# Patient Record
Sex: FEMALE | Race: WHITE | NOT HISPANIC OR LATINO | URBAN - METROPOLITAN AREA
[De-identification: names, ages, dates, MRNs, and addresses within clinical notes are randomized per-mention and may not be internally consistent; named-entity substitution may affect disease eponyms.]

---

## 2024-08-19 ENCOUNTER — OFFICE VISIT (OUTPATIENT)
Dept: SPORTS MEDICINE | Facility: CLINIC | Age: 54
End: 2024-08-19
Payer: COMMERCIAL

## 2024-08-19 ENCOUNTER — HOSPITAL ENCOUNTER (OUTPATIENT)
Dept: RADIOLOGY | Facility: HOSPITAL | Age: 54
Discharge: HOME | End: 2024-08-19
Attending: ORTHOPAEDIC SURGERY
Payer: COMMERCIAL

## 2024-08-19 VITALS — BODY MASS INDEX: 25.99 KG/M2 | WEIGHT: 156 LBS | HEIGHT: 65 IN

## 2024-08-19 DIAGNOSIS — M25.561 PAIN AND SWELLING OF RIGHT KNEE: ICD-10-CM

## 2024-08-19 DIAGNOSIS — M25.561 RIGHT KNEE PAIN, UNSPECIFIED CHRONICITY: Primary | ICD-10-CM

## 2024-08-19 DIAGNOSIS — M25.561 RIGHT KNEE PAIN, UNSPECIFIED CHRONICITY: ICD-10-CM

## 2024-08-19 DIAGNOSIS — M25.461 PAIN AND SWELLING OF RIGHT KNEE: ICD-10-CM

## 2024-08-19 DIAGNOSIS — M25.461 EFFUSION OF RIGHT KNEE: ICD-10-CM

## 2024-08-19 PROCEDURE — 73564 X-RAY EXAM KNEE 4 OR MORE: CPT | Mod: 26,RT,, | Performed by: RADIOLOGY

## 2024-08-19 PROCEDURE — 1159F MED LIST DOCD IN RCRD: CPT | Mod: CPTII,S$GLB,, | Performed by: ORTHOPAEDIC SURGERY

## 2024-08-19 PROCEDURE — 73564 X-RAY EXAM KNEE 4 OR MORE: CPT | Mod: TC,PN,RT

## 2024-08-19 PROCEDURE — 1160F RVW MEDS BY RX/DR IN RCRD: CPT | Mod: CPTII,S$GLB,, | Performed by: ORTHOPAEDIC SURGERY

## 2024-08-19 PROCEDURE — 99204 OFFICE O/P NEW MOD 45 MIN: CPT | Mod: S$GLB,,, | Performed by: ORTHOPAEDIC SURGERY

## 2024-08-19 PROCEDURE — 99999 PR PBB SHADOW E&M-NEW PATIENT-LVL III: CPT | Mod: PBBFAC,,, | Performed by: ORTHOPAEDIC SURGERY

## 2024-08-19 PROCEDURE — 73562 X-RAY EXAM OF KNEE 3: CPT | Mod: 26,59,LT, | Performed by: RADIOLOGY

## 2024-08-19 PROCEDURE — 73562 X-RAY EXAM OF KNEE 3: CPT | Mod: TC,PN,LT

## 2024-08-19 RX ORDER — NAPROXEN 500 MG/1
500 TABLET ORAL 2 TIMES DAILY
Qty: 60 TABLET | Refills: 0 | Status: SHIPPED | OUTPATIENT
Start: 2024-08-19

## 2024-08-19 RX ORDER — IBUPROFEN 200 MG
200 TABLET ORAL
COMMUNITY
End: 2024-08-19

## 2024-08-19 RX ORDER — DICLOFENAC SODIUM 10 MG/G
2 GEL TOPICAL 3 TIMES DAILY
Qty: 1 EACH | Refills: 1 | Status: SHIPPED | OUTPATIENT
Start: 2024-08-19

## 2024-08-19 NOTE — PROGRESS NOTES
Patient ID: Zainab Mcgraw  YOB: 1970  MRN: [id number]    Chief Complaint: Pain, Injury, and Swelling of the Right Knee    Referred By: Surgery patient     History of Present Illness: Zainab Mcgraw is a 53 y.o. female [facility] ([facility]) Data Unavailable with a chief complaint of Pain, Injury, and Swelling of the Right Knee      Zainab Mcgraw presents today 1 week s/p right knee arthroscopy, right knee ACL reconstruction with partial thickness quad tendon autograft, and partial meniscectomy on 8/13/24. She presents FWB/WBAT: right lower extremity with TROM brace/assistive devices. The patient has started physical therapy at [facility] working with Luke [place]. Overall there are no issues or concerns.     Past Medical History:   History reviewed. No pertinent past medical history.  History reviewed. No pertinent surgical history.  No family history on file.  Social History     Socioeconomic History    Marital status: Single   Tobacco Use    Smoking status: Never     Passive exposure: Past    Smokeless tobacco: Never   Social History Narrative    ** Merged History Encounter **          Social Determinants of Health     Financial Resource Strain: Low Risk  (8/15/2024)    Received from [facility]    Overall Financial Resource Strain (CARDIA)     Difficulty of Paying Living Expenses: Not hard at all   Food Insecurity: No Food Insecurity (8/15/2024)    Received from [facility]    Hunger Vital Sign     Worried About Running Out of Food in the Last Year: Never true     Ran Out of Food in the Last Year: Never true   Transportation Needs: No Transportation Needs (8/15/2024)    Received from [facility]    PRAPARE - Transportation     Lack of Transportation (Medical): No     Lack of Transportation (Non-Medical): No   Housing Stability: Unknown (8/15/2024)    Received from [facility]    Housing Stability Vital Sign     Unable to Pay for Housing in the  Last Year: No     Homeless in the Last Year: No     Medication List with Changes/Refills   New Medications    DICLOFENAC SODIUM (VOLTAREN) 1 % GEL    Apply 2 g topically 3 (three) times daily.    NAPROXEN (NAPROSYN) 500 MG TABLET    Take 1 tablet (500 mg total) by mouth 2 (two) times daily.   Current Medications    CHOLECALCIFEROL, VITD3,/VIT K2 (VITAMIN D3-VITAMIN K2 ORAL)    Take by mouth.    LACTOBACILLUS RHAMNOSUS GG (CULTURELLE) 10 BILLION CELL CAPSULE    Take 1 capsule by mouth.    TURMERIC ORAL    Take 1 capsule by mouth every evening.   Discontinued Medications    IBUPROFEN (ADVIL,MOTRIN) 200 MG TABLET    Take 200 mg by mouth.     Review of patient's allergies indicates:  No Known Allergies  ROS    Physical Exam:   Body mass index is 25.96 kg/m².  There were no vitals filed for this visit.   GENERAL: Well appearing, appropriate for stated age, no acute distress.  CARDIOVASCULAR: Pulses regular by peripheral palpation.  PULMONARY: Respirations are even and non-labored.  NEURO: Awake, alert, and oriented x 3.  PSYCH: Mood & affect are appropriate.  HEENT: Head is normocephalic and atraumatic.    Ortho/SPM Exam  Right Knee Exam  No signs of infection  Minimal effusion   Knee ROM full extension to 90 degrees flexion   All compartments are soft and compressible. Calf soft non-tender. Intact EHL, FHL, gastrocsoleus, and tibialis anterior. Sensation intact to light touch in superficial peroneal, deep peroneal, tibial, sural, and saphenous nerve distributions. Foot warm and well perfused with capillary refill of less than 2 seconds and palpable pedal pulses.       Imaging:   XR Results:  Results for orders placed during the hospital encounter of 08/19/24    X-ray Knee Ortho Right with Flexion    Narrative  EXAM: XR KNEE ORTHO RIGHT WITH FLEXION    CLINICAL HISTORY: Knee pain    FINDINGS:  No fracture or dislocation is identified.  A small joint effusion is visible on the lateral view.  Joint spaces are maintained at  both knees.    Impression  1.  No evidence of acute osseous injury.  2.  Small right knee joint effusion.    Finalized on: 8/19/2024 4:34 PM By:  Gabe Rodriguez  [facility]# [id number]      2024-08-19 16:36:[id number]    BRODY      Relevant imaging results reviewed and interpreted by me, discussed with the patient and / or family today.      Patient Instructions   Assessment:  Zainab Mcgraw is a 53 y.o. female [facility] ([facility]) Data Unavailable with a chief complaint of Pain, Injury, and Swelling of the Right Knee    1 week right knee arthroscopy, right knee ACL reconstruction with partial thickness quad tendon autograft, and partial meniscectomy on 8/13/24    Encounter Diagnoses   Name Primary?    Right knee pain, unspecified chronicity Yes    S/P ACL reconstruction     S/P arthroscopic partial medial meniscectomy     Post-operative state     Weakness of right quadriceps muscle     Chronic instability of right knee     Acute postoperative pain of right groin       Plan:  Recommended to continue physical therapy   Discussed stopping all prescription medication and only using OTC   Follow up in 1 week as schedule     Follow-up: 1 week or sooner if there are any problems between now and then.    Leave Review:   Google: Leave Google Review  Healthgrades: Leave Healthgrades Review    After Hours Number: [phone]      Provider Note/Medical Decision Making:   We discussed continuing physical therapy and working on quad strength at this time her quad muscle is weak and over compensating muscles are causing some pain.  Discussed with therapist as well.    I discussed worrisome and red flag signs and symptoms with the patient. The patient expressed understanding and agreed to alert me immediately or to go to the emergency room if they experience any of these.   Treatment plan was developed with input from the patient/family, and they expressed understanding and agreement with the plan. All questions  were answered today.      Tereza Rodríguez PA-C  Sports Medicine Physician Assistant       Disclaimer: This note was prepared using a voice recognition system and is likely to have sound alike errors within the text.

## 2024-08-20 NOTE — PATIENT INSTRUCTIONS
Assessment:  Zainab Mcgraw is a  53 y.o. female [facility] ([facility]) Data Unavailable with a chief complaint of Pain, Injury, and Swelling of the Right Knee  NP, self referred (has several family members that are patients), presents today for right knee pain  Right knee pain    Encounter Diagnoses   Name Primary?    Right knee pain, unspecified chronicity Yes    Pain and swelling of right knee     Body mass index (BMI) 25.0-25.9, adult       Plan:  Plan CSI on Friday due to colonoscopy scheduled for tomorrow   Start naproxen 500mg BID & Voltaren gel after colonoscopy  Continue compressive knee brace  Follow-up in 6 weeks  If no better did discuss possible MRI    Follow-up: 6 weeks or sooner if there are any problems between now and then.    Leave Review:   Google: Leave Google Review  Healthgrades: Leave Healthgrades Review    After Hours Number: [phone]

## 2024-08-20 NOTE — PROGRESS NOTES
Patient ID: Zainab Mcgraw  YOB: 1970  MRN: [id number]    Chief Complaint: Pain, Injury, and Swelling of the Right Knee    Referred By: Family members     History of Present Illness: Zainab Mcgraw is a  53 y.o. female [facility] ([facility]) Data Unavailable with a chief complaint of Pain, Injury, and Swelling of the Right Knee    Zainab Mcgraw is a 53 y.o. female presents to clinic today for evaluation of right knee pain.  She was self-referred for further evaluation management of the knee pain that has been going on for several months.  She originally notice injury back in February which she stepped off a ledge and hyperextending her knee.  She has placed her knee in a knee brace in had continued with doing at-home exercises.  She stated she notices swelling to go down and noticed that the knee pain got better.  She then re-injured her knee on 08/14 while playing pickleball without her brace.  She has noticed swelling and pain since the pain is worse with walking, moving or changing positions.  Rates the pain as a 3/10.  Denies any fevers, chills, night sweats, numbness and tingling    Pain  Associated symptoms include joint swelling. Pertinent negatives include no abdominal pain, chest pain, chills, coughing, fever, nausea, numbness, rash, sore throat or vomiting.   Injury  Associated symptoms include joint swelling. Pertinent negatives include no abdominal pain, chest pain, chills, coughing, fever, nausea, numbness, rash, sore throat or vomiting.   Swelling  Associated symptoms include joint swelling. Pertinent negatives include no abdominal pain, chest pain, chills, coughing, fever, nausea, numbness, rash, sore throat or vomiting.       Past Medical History:   History reviewed. No pertinent past medical history.  History reviewed. No pertinent surgical history.  No family history on file.  Social History     Socioeconomic History    Marital status: Single   Tobacco  Use    Smoking status: Never     Passive exposure: Past    Smokeless tobacco: Never   Social History Narrative    ** Merged History Encounter **          Social Determinants of Health     Financial Resource Strain: Low Risk  (8/15/2024)    Received from [facility]    Overall Financial Resource Strain (CARDIA)     Difficulty of Paying Living Expenses: Not hard at all   Food Insecurity: No Food Insecurity (8/15/2024)    Received from [facility]    Hunger Vital Sign     Worried About Running Out of Food in the Last Year: Never true     Ran Out of Food in the Last Year: Never true   Transportation Needs: No Transportation Needs (8/15/2024)    Received from [facility]    PRAPARE - Transportation     Lack of Transportation (Medical): No     Lack of Transportation (Non-Medical): No   Housing Stability: Unknown (8/15/2024)    Received from [facility]    Housing Stability Vital Sign     Unable to Pay for Housing in the Last Year: No     Homeless in the Last Year: No     Medication List with Changes/Refills   New Medications    DICLOFENAC SODIUM (VOLTAREN) 1 % GEL    Apply 2 g topically 3 (three) times daily.    NAPROXEN (NAPROSYN) 500 MG TABLET    Take 1 tablet (500 mg total) by mouth 2 (two) times daily.   Current Medications    CHOLECALCIFEROL, VITD3,/VIT K2 (VITAMIN D3-VITAMIN K2 ORAL)    Take by mouth.    LACTOBACILLUS RHAMNOSUS GG (CULTURELLE) 10 BILLION CELL CAPSULE    Take 1 capsule by mouth.    TURMERIC ORAL    Take 1 capsule by mouth every evening.   Discontinued Medications    IBUPROFEN (ADVIL,MOTRIN) 200 MG TABLET    Take 200 mg by mouth.     Review of patient's allergies indicates:  No Known Allergies  Review of Systems   Constitutional: Negative for chills and fever.   HENT:  Negative for sore throat.    Eyes:  Negative for pain.   Cardiovascular:  Negative for chest pain and leg swelling.   Respiratory:  Negative for cough and shortness of breath.    Skin:  Negative for itching and rash.    Musculoskeletal:  Positive for joint pain and joint swelling.   Gastrointestinal:  Negative for abdominal pain, nausea and vomiting.   Genitourinary:  Negative for dysuria.   Neurological:  Negative for dizziness, numbness and paresthesias.       Physical Exam:   Body mass index is 25.96 kg/m².  There were no vitals filed for this visit.   GENERAL: Well appearing, appropriate for stated age, no acute distress.  CARDIOVASCULAR: Pulses regular by peripheral palpation.  PULMONARY: Respirations are even and non-labored.  NEURO: Awake, alert, and oriented x 3.  PSYCH: Mood & affect are appropriate.  HEENT: Head is normocephalic and atraumatic.  General    Nursing note and vitals reviewed.          Right Knee Exam   Right knee exam is normal.    Inspection   Effusion: present    Tenderness   The patient is tender to palpation of the medial joint line.    Crepitus   The patient has crepitus of the patella.    Range of Motion   Extension:  0   Flexion:  130     Tests   Meniscus   Michelle:  Medial - positive   Ligament Examination   Lachman: normal (-1 to 2mm)   PCL-Posterior Drawer: normal (0 to 2mm)     MCL - Valgus: normal (0 to 2mm)  LCL - Varus: normal    Other   Sensation: normal    Left Knee Exam   Left knee exam is normal.    Inspection   Effusion: absent    Tenderness   The patient is experiencing no tenderness.     Crepitus   The patient has crepitus of the patella.    Range of Motion   Extension:  0   Flexion:  140     Tests   Stability   Lachman: normal (-1 to 2mm)   PCL-Posterior Drawer: normal (0 to 2mm)  MCL - Valgus: normal (0 to 2mm)  LCL - Varus: normal (0 to 2mm)    Other   Sensation: normal    Muscle Strength   Right Lower Extremity   Hip Abduction: 5/5   Quadriceps:  5/5   Hamstrin/5   Left Lower Extremity   Hip Abduction: 5/5   Quadriceps:  5/5   Hamstrin/5     Vascular Exam     Right Pulses  Dorsalis Pedis:      2+  Posterior Tibial:      2+        Left Pulses  Dorsalis Pedis:       2+  Posterior Tibial:      2+        All compartments are soft and compressible. Calf soft non-tender. Intact EHL, FHL, gastroc soleus, and tibialis anterior. Sensation intact to light touch in superficial peroneal, deep peroneal, tibial, sural, and saphenous nerve distributions. Foot warm and well perfused with capillary refill of less than 2 seconds and palpable pedal pulses.     Imaging:    X-ray Knee Ortho Right with Flexion  Narrative: EXAM: XR KNEE ORTHO RIGHT WITH FLEXION    CLINICAL HISTORY: Knee pain    FINDINGS:  No fracture or dislocation is identified.  A small joint effusion is visible on the lateral view.  Joint spaces are maintained at both knees.  Impression: 1.  No evidence of acute osseous injury.  2.  Small right knee joint effusion.    Finalized on: 8/19/2024 4:34 PM By:  Gabe Rodriguez  [facility]# [id number]      2024-08-19 16:36:[id number]    BRRG      Relevant imaging results reviewed and interpreted by me, discussed with the patient and / or family today.     Other Tests:         Patient Instructions   Assessment:  Zainab Mcgraw is a  53 y.o. female [facility] ([facility]) Data Unavailable with a chief complaint of Pain, Injury, and Swelling of the Right Knee  NP, self referred (has several family members that are patients), presents today for right knee pain  Right knee pain    Encounter Diagnoses   Name Primary?    Right knee pain, unspecified chronicity Yes    Pain and swelling of right knee     Body mass index (BMI) 25.0-25.9, adult       Plan:  Plan CSI on Friday due to colonoscopy scheduled for tomorrow   Start naproxen 500mg BID & Voltaren gel after colonoscopy  Continue compressive knee brace  Follow-up in 6 weeks  If no better did discuss possible MRI    Follow-up: 6 weeks or sooner if there are any problems between now and then.    Leave Review:   Google: Leave Google Review  Healthgrades: Leave Healthgrades Review    After Hours Number: [phone]         Provider  Note/Medical Decision Making:       I discussed worrisome and red flag signs and symptoms with the patient. The patient expressed understanding and agreed to alert me immediately or to go to the emergency room if they experience any of these.   Treatment plan was developed with input from the patient/family, and they expressed understanding and agreement with the plan. All questions were answered today.          Maximiliano Quispe MD  Orthopaedic Surgery & Sports Medicine       Disclaimer: This note was prepared using a voice recognition system and is likely to have sound alike errors within the text.

## 2024-08-23 ENCOUNTER — OFFICE VISIT (OUTPATIENT)
Dept: SPORTS MEDICINE | Facility: CLINIC | Age: 54
End: 2024-08-23
Payer: COMMERCIAL

## 2024-08-23 VITALS — HEIGHT: 65 IN | WEIGHT: 155 LBS | BODY MASS INDEX: 25.83 KG/M2

## 2024-08-23 DIAGNOSIS — M25.461 PAIN AND SWELLING OF RIGHT KNEE: Primary | ICD-10-CM

## 2024-08-23 DIAGNOSIS — M25.561 PAIN AND SWELLING OF RIGHT KNEE: Primary | ICD-10-CM

## 2024-08-23 DIAGNOSIS — M25.461 EFFUSION OF RIGHT KNEE: ICD-10-CM

## 2024-08-23 PROCEDURE — 20610 DRAIN/INJ JOINT/BURSA W/O US: CPT | Mod: RT,S$GLB,, | Performed by: PHYSICIAN ASSISTANT

## 2024-08-23 PROCEDURE — 99499 UNLISTED E&M SERVICE: CPT | Mod: S$GLB,,, | Performed by: PHYSICIAN ASSISTANT

## 2024-08-23 PROCEDURE — 99999 PR PBB SHADOW E&M-EST. PATIENT-LVL III: CPT | Mod: PBBFAC,,, | Performed by: PHYSICIAN ASSISTANT

## 2024-08-23 RX ADMIN — METHYLPREDNISOLONE ACETATE 80 MG: 80 INJECTION, SUSPENSION INTRA-ARTICULAR; INTRALESIONAL; INTRAMUSCULAR; SOFT TISSUE at 11:08

## 2024-08-26 ENCOUNTER — CLINICAL SUPPORT (OUTPATIENT)
Dept: REHABILITATION | Facility: HOSPITAL | Age: 54
End: 2024-08-26
Payer: COMMERCIAL

## 2024-08-26 DIAGNOSIS — M25.661 DECREASED RANGE OF MOTION OF RIGHT KNEE: ICD-10-CM

## 2024-08-26 DIAGNOSIS — R29.898 WEAKNESS OF RIGHT LOWER EXTREMITY: Primary | ICD-10-CM

## 2024-08-26 DIAGNOSIS — M25.561 RIGHT KNEE PAIN, UNSPECIFIED CHRONICITY: ICD-10-CM

## 2024-08-26 PROCEDURE — 97161 PT EVAL LOW COMPLEX 20 MIN: CPT

## 2024-08-26 PROCEDURE — 97112 NEUROMUSCULAR REEDUCATION: CPT

## 2024-08-26 RX ORDER — METHYLPREDNISOLONE ACETATE 80 MG/ML
80 INJECTION, SUSPENSION INTRA-ARTICULAR; INTRALESIONAL; INTRAMUSCULAR; SOFT TISSUE
Status: DISCONTINUED | OUTPATIENT
Start: 2024-08-23 | End: 2024-08-26 | Stop reason: HOSPADM
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
## 2024-08-26 NOTE — PROCEDURES
Large Joint Aspiration/Injection: R knee    Date/Time: 8/23/2024 11:30 AM    Performed by: Tereza Hernandez PA-C  Authorized by: Tereza Hernandez PA-C    Consent Done?:  Yes (Verbal)  Indications:  Joint swelling and pain  Site marked: the procedure site was marked    Timeout: prior to procedure the correct patient, procedure, and site was verified    Prep: patient was prepped and draped in usual sterile fashion      Local anesthesia used?: Yes    Anesthesia:  Local infiltration  Local anesthetic:  Bupivacaine 0.5% without epinephrine, lidocaine 1% without epinephrine, topical anesthetic and lidocaine spray    Details:  Needle Size:  22 G  Approach:  Superior  Location:  Knee  Site:  R knee  Medications:  80 mg methylPREDNISolone acetate 80 mg/mL  Patient tolerance:  Patient tolerated the procedure well with no immediate complications     2cc 1% lidocaine plain, 2cc 0.5% marcaine plain, 0.5cc 80mg methylprednisolone    Procedure Note:  We discussed the risk and benefits of injections, including pain, infection, bleeding, damage to adjacent structures, risk of reaction to injection. We discussed the steroid/cortisone injections will not heal the problem but mat help decrease inflammation and help with symptoms. We discussed the risk of repeated injections. The patient expressed understanding and wanted to proceed with the injection. We performed a timeout to verify the proper patient, proper procedure, and the proper site. The injection site was prepared in a sterile fashion. The patient tolerated it well and there were no complication. We did discuss with the patient that steroid injections can cause some increase in blood sugar and blood pressure for up to a week after the injection.

## 2024-08-27 PROBLEM — M25.661 DECREASED RANGE OF MOTION OF RIGHT KNEE: Status: ACTIVE | Noted: 2024-08-27

## 2024-08-27 PROBLEM — R29.898 WEAKNESS OF RIGHT LOWER EXTREMITY: Status: ACTIVE | Noted: 2024-08-27

## 2024-08-27 PROBLEM — M25.561 RIGHT KNEE PAIN: Status: ACTIVE | Noted: 2024-08-27

## 2024-08-29 ENCOUNTER — CLINICAL SUPPORT (OUTPATIENT)
Dept: REHABILITATION | Facility: HOSPITAL | Age: 54
End: 2024-08-29
Payer: COMMERCIAL

## 2024-08-29 DIAGNOSIS — R29.898 WEAKNESS OF RIGHT LOWER EXTREMITY: ICD-10-CM

## 2024-08-29 DIAGNOSIS — M25.561 CHRONIC PAIN OF RIGHT KNEE: Primary | ICD-10-CM

## 2024-08-29 DIAGNOSIS — M25.661 DECREASED RANGE OF MOTION OF RIGHT KNEE: ICD-10-CM

## 2024-08-29 DIAGNOSIS — G89.29 CHRONIC PAIN OF RIGHT KNEE: Primary | ICD-10-CM

## 2024-08-29 PROCEDURE — 97530 THERAPEUTIC ACTIVITIES: CPT

## 2024-08-29 PROCEDURE — 97110 THERAPEUTIC EXERCISES: CPT

## 2024-08-29 PROCEDURE — 97112 NEUROMUSCULAR REEDUCATION: CPT

## 2024-08-31 NOTE — PROGRESS NOTES
"[facility]   Physical Therapy Treatment Note        Name: Zainab Mcgraw  Clinic Number: [id number]    Therapy Diagnosis:   Encounter Diagnoses   Name Primary?    Chronic pain of right knee Yes    Weakness of right lower extremity     Decreased range of motion of right knee      Physician: Tereza Hernandez, *    Visit Date: 8/29/2024    Physician Orders: PT Eval and Treat  Medical Diagnosis from Referral: Right knee pain, unspecified chronicity [M25.561]   Evaluation Date: 8/26/2024  Authorization Period Expiration: 8/19/2025  Plan of Care Expiration: 11/19/2024  Progress Note Due: 9/24/2024  Visit # / Visits authorized: 1/15   FOTO: 1/3 (last performed on 8/26/2024)     Precautions: Standard    PTA Visit #: 0/5     Time In: 1600  Time Out: 1700  Total Billable Time: 55 minutes    SUBJECTIVE     Pt reports:  She feels pretty good  Compliance with Hep: Daily  Response to previous treatment: no adverse reactions to treatment/updated HEP  Functional change: Better    Pain: 0/10   Worst: 8/10  Location: knee      OBJECTIVE     Objective Measures updated at progress report unless specified otherwise.      Treatment     Gym/Equipment Access: full  Time to Complete Exercises: increased for cueing and education    Lisa received the treatments listed below:      CPT Intervention Performed   Today Duration / Intensity   MT Patella MOBS x   5 minutes - long sit   TE Chondral Mobility:  x Upright Bike Level 4 -      Prone Knee Flexion X 30" holds, x5 each side.     Dynamic Warm X Hugs, sweeps, grabs     Seated Knee Ext matrix x DL  4x8  #25     Seated Knee flexion Matrix x DL  4x8  #25     RDL with KB SS2- 3 rounds x #25 x15     Hip Thrust SS2- 3 rounds x #10 sand bag x15   NMR Hip Miniband Series: [place] Loop  Marches  Extension  Lateral Walks         2 mins  2 mins  3 laps      Side Planks    30s              TA Heel elevated Squats         Sled Push SS1 x #70 - push & pull 1 lap x3 rounds    " " Wall Sits  SS1 x 30" x3 rounds   PLAN             CPT Codes available for Billing:   (5) minutes of Manual therapy (MT) to improve pain and ROM.  (30) minutes of Therapeutic Exercise (TE) to develop strength, endurance, range of motion, and flexibility.  (15) minutes of Neuromuscular Re-Education (NMR)  to improve: Balance, Coordination, Kinesthetic, Sense, Proprioception, and Posture.  (15) minutes of Therapeutic Activities (TA) to improve functional performance.  (0) minutes of Gait Training (GT) to improve functional gait mechanics.  (0) minutes of Self- Care and Education  Unattended Electrical Stimulation (ES) for muscle performance or pain modulation.  Vasopneumatic Device Therapy () for management of swelling/edema. (22589)  BFR: Blood flow restriction applied during exercise  NP or (-): Not Performed    See EMR under MEDIA for written consent provided for Dry Needling- DATE   Palpation Assessment to determine the necessity for Functional Dry Needling     PATIENT EDUCATION AND HOME EXERCISES      Education/Self-Care provided:  (included in treatment unless specified above) minutes   Patient educated on the impairments noted above and the effects of physical therapy intervention to improve overall condition and Quality of Life  Patient was educated on all the above exercise prior/during/after for proper posture, positioning, and execution for safe performance with home exercise program.      Written Home Exercises Provided: yes. Prefers: [x] Printed [x] Electronic  Exercises were reviewed and Rylee was able to demonstrate them prior to the end of the session.  Rylee demonstrated good understanding of the education provided. See EMR under Patient Instructions for exercises provided during therapy sessions.    ASSESSMENT     Lisa Mcgraw tolerated Physical Therapy  session well with minimal  complaints of pain or discomfort.  Objective findings are improving with pain, range of motion, strength.  Lisabharti Mcclureelan " continues to have difficulty with heavy extensor chain resistance, but was able to complete with modifications.  Therapy exercises were reviewed by revisiting exercises given from previous home exercise program while adding circuit style exercises to focus on global exercise based strength work.  Handouts were not issued during today's visit. Zainab demonstrated good understanding of new exercises and will continue to progress at home until next follow-up.      Lisa Is progressing well towards her goals.   Pt prognosis is Good.     Pt will continue to benefit from skilled outpatient physical therapy to address the deficits listed in the problem list box on initial evaluation, provide pt/family education and to maximize pt's level of independence in the home and community environment.     Pt's spiritual, cultural and educational needs considered and pt agreeable to plan of care and goals.    Anticipated Barriers for therapy: adherence to treatment plan and coping style       Short Term Goals:  6 weeks Status  Date Met   PAIN: Pt will report worst pain of 6/10 in order to progress toward max functional ability and improve quality of life. [x] Progressing  [] Met  [] Not Met     FUNCTION: Patient will demonstrate improved function as indicated by a score of greater than or equal to 80 out of 100 on FOTO. [x] Progressing  [] Met  [] Not Met     MOBILITY: Patient will improve AROM to 50% of stated goals, listed in objective measures above, in order to progress towards independence with functional activities.  [x] Progressing  [] Met  [] Not Met     STRENGTH: Patient will improve strength to 50% of stated goals, listed in objective measures above, in order to progress towards independence with functional activities. [x] Progressing  [] Met  [] Not Met     POSTURE: Patient will correct postural deviations in sitting and standing, to decrease pain and promote long term stability.  [x] Progressing  [] Met  [] Not Met      GAIT: Patient will demonstrate improved gait mechanics including improved step length and stance time in order to improve functional mobility, improve quality of life, and decrease risk of further injury or fall.  [x] Progressing  [] Met  [] Not Met     HEP: Patient will demonstrate independence with HEP in order to progress toward functional independence. [x] Progressing  [] Met  [] Not Met        Long Term Goals:  12 weeks Status Date Met   PAIN: Pt will report worst pain of 2/10 in order to progress toward max functional ability and improve quality of life [x] Progressing  [] Met  [] Not Met     FUNCTION: Patient will demonstrate improved function as indicated by a score of greater than or equal to 83 out of 100 on FOTO. [x] Progressing  [] Met  [] Not Met     MOBILITY: Patient will improve AROM to stated goals, listed in objective measures above, in order to return to maximal functional potential and improve quality of life.  [x] Progressing  [] Met  [] Not Met     STRENGTH: Patient will improve strength to stated goals, listed in objective measures above, in order to improve functional independence and quality of life.  [x] Progressing  [] Met  [] Not Met     GAIT: Patient will demonstrate normalized gait mechanics with minimal compensation in order to return to PLOF. [x] Progressing  [] Met  [] Not Met     Patient will return to normal ADL's, IADL's, community involvement, recreational activities, and work-related activities with less than or equal to 2/10 pain and maximal function.  [x] Progressing  [] Met  [] Not Met        Plan      Plan of care Certification: 8/26/2024 to 11/19/2024.    PLAN     Continue Plan of Care (POC) and progress per patient tolerance. See Treatment section for exercise progression.    Columba Daniel, PT    Disclaimer: This note was prepared using a voice recognition system and is likely to have sound alike errors within the text.

## 2024-09-05 ENCOUNTER — CLINICAL SUPPORT (OUTPATIENT)
Dept: REHABILITATION | Facility: HOSPITAL | Age: 54
End: 2024-09-05
Payer: COMMERCIAL

## 2024-09-05 DIAGNOSIS — M25.661 DECREASED RANGE OF MOTION OF RIGHT KNEE: ICD-10-CM

## 2024-09-05 DIAGNOSIS — R29.898 WEAKNESS OF RIGHT LOWER EXTREMITY: ICD-10-CM

## 2024-09-05 DIAGNOSIS — G89.29 CHRONIC PAIN OF RIGHT KNEE: Primary | ICD-10-CM

## 2024-09-05 DIAGNOSIS — M25.561 CHRONIC PAIN OF RIGHT KNEE: Primary | ICD-10-CM

## 2024-09-05 PROCEDURE — 97530 THERAPEUTIC ACTIVITIES: CPT

## 2024-09-05 PROCEDURE — 97110 THERAPEUTIC EXERCISES: CPT

## 2024-09-06 ENCOUNTER — CLINICAL SUPPORT (OUTPATIENT)
Dept: REHABILITATION | Facility: HOSPITAL | Age: 54
End: 2024-09-06
Payer: COMMERCIAL

## 2024-09-06 DIAGNOSIS — G89.29 CHRONIC PAIN OF RIGHT KNEE: Primary | ICD-10-CM

## 2024-09-06 DIAGNOSIS — M25.661 DECREASED RANGE OF MOTION OF RIGHT KNEE: ICD-10-CM

## 2024-09-06 DIAGNOSIS — M25.561 CHRONIC PAIN OF RIGHT KNEE: Primary | ICD-10-CM

## 2024-09-06 DIAGNOSIS — R29.898 WEAKNESS OF RIGHT LOWER EXTREMITY: ICD-10-CM

## 2024-09-06 PROCEDURE — 97110 THERAPEUTIC EXERCISES: CPT

## 2024-09-06 PROCEDURE — 97112 NEUROMUSCULAR REEDUCATION: CPT

## 2024-09-06 PROCEDURE — 97530 THERAPEUTIC ACTIVITIES: CPT

## 2024-09-06 NOTE — PROGRESS NOTES
"[facility]   Physical Therapy Treatment Note        Name: Zainab Mcgraw  Clinic Number: [id number]    Therapy Diagnosis:   Encounter Diagnoses   Name Primary?    Chronic pain of right knee Yes    Weakness of right lower extremity     Decreased range of motion of right knee      Physician: Tereza Hernandez, *    Visit Date: 9/5/2024    Physician Orders: PT Eval and Treat  Medical Diagnosis from Referral: Right knee pain, unspecified chronicity [M25.561]   Evaluation Date: 8/26/2024  Authorization Period Expiration: 8/19/2025  Plan of Care Expiration: 11/19/2024  Progress Note Due: 9/24/2024  Visit # / Visits authorized: 2 /15   FOTO: 1/3 (last performed on 8/26/2024)     Precautions: Standard    PTA Visit #: 0/5     Time In: 1600  Time Out: 1700  Total Billable Time: 55 minutes    SUBJECTIVE     Pt reports:  Lisa reports a great weekend in [facility], with no increased pain or discomfort. She was able to walk over 15,000 steps a day while she was in [facility] and did not have too many issues.  Compliance with Hep: Daily  Response to previous treatment: no adverse reactions to treatment/updated HEP  Functional change: Better    Pain: 0/10   Worst: 8/10  Location: knee      OBJECTIVE     Objective Measures updated at progress report unless specified otherwise.      Treatment     Gym/Equipment Access: full  Time to Complete Exercises: increased for cueing and education    Lisa received the treatments listed below:      CPT Intervention Performed   Today Duration / Intensity   MT Patella MOBS x   5 minutes - long sit   TE Chondral Mobility:  x Upright Bike Level 4 -      Prone Knee Flexion X 30" holds, x5 each side.     Dynamic Warm X Hugs, sweeps, grabs     Seated Knee Ext matrix x DL  4x8  #25     Seated Knee flexion Matrix x DL  4x8  #25     RDL with KB SS2- 3 rounds x #25 x15     Hip Thrust SS2- 3 rounds x #10 sand bag x15   NMR Hip Miniband Series: [place] Loop  Marches  Extension  Lateral " "Walks         2 mins  2 mins  3 laps      Side Planks    30s              TA Heel elevated Squats         Sled Push SS1 x #70 - push & pull 1 lap x3 rounds     Wall Sits  SS1 x 30" x3 rounds   PLAN             CPT Codes available for Billing:   (5) minutes of Manual therapy (MT) to improve pain and ROM.  (40) minutes of Therapeutic Exercise (TE) to develop strength, endurance, range of motion, and flexibility.  (00) minutes of Neuromuscular Re-Education (NMR)  to improve: Balance, Coordination, Kinesthetic, Sense, Proprioception, and Posture.  (15) minutes of Therapeutic Activities (TA) to improve functional performance.  (0) minutes of Gait Training (GT) to improve functional gait mechanics.  (0) minutes of Self- Care and Education  Unattended Electrical Stimulation (ES) for muscle performance or pain modulation.  Vasopneumatic Device Therapy () for management of swelling/edema. (99092)  BFR: Blood flow restriction applied during exercise  NP or (-): Not Performed    See EMR under MEDIA for written consent provided for Dry Needling- DATE   Palpation Assessment to determine the necessity for Functional Dry Needling     PATIENT EDUCATION AND HOME EXERCISES      Education/Self-Care provided:  (included in treatment unless specified above) minutes   Patient educated on the impairments noted above and the effects of physical therapy intervention to improve overall condition and Quality of Life  Patient was educated on all the above exercise prior/during/after for proper posture, positioning, and execution for safe performance with home exercise program.      Written Home Exercises Provided: yes. Prefers: [x] Printed [x] Electronic  Exercises were reviewed and Rylee was able to demonstrate them prior to the end of the session.  Rylee demonstrated good understanding of the education provided. See EMR under Patient Instructions for exercises provided during therapy sessions.    ASSESSMENT     Ms Gonzalez tolerated PT " session with minimal complaints pain or discomfort. She was challenged with functional strength training with sled due to cardiovascular endurance and muscular endurance deficits, but no pain or adverse reactions were reported to date.    Lisa Is progressing well towards her goals.   Pt prognosis is Good.     Pt will continue to benefit from skilled outpatient physical therapy to address the deficits listed in the problem list box on initial evaluation, provide pt/family education and to maximize pt's level of independence in the home and community environment.     Pt's spiritual, cultural and educational needs considered and pt agreeable to plan of care and goals.    Anticipated Barriers for therapy: adherence to treatment plan and coping style       Short Term Goals:  6 weeks Status  Date Met   PAIN: Pt will report worst pain of 6/10 in order to progress toward max functional ability and improve quality of life. [x] Progressing  [] Met  [] Not Met     FUNCTION: Patient will demonstrate improved function as indicated by a score of greater than or equal to 80 out of 100 on FOTO. [x] Progressing  [] Met  [] Not Met     MOBILITY: Patient will improve AROM to 50% of stated goals, listed in objective measures above, in order to progress towards independence with functional activities.  [x] Progressing  [] Met  [] Not Met     STRENGTH: Patient will improve strength to 50% of stated goals, listed in objective measures above, in order to progress towards independence with functional activities. [x] Progressing  [] Met  [] Not Met     POSTURE: Patient will correct postural deviations in sitting and standing, to decrease pain and promote long term stability.  [x] Progressing  [] Met  [] Not Met     GAIT: Patient will demonstrate improved gait mechanics including improved step length and stance time in order to improve functional mobility, improve quality of life, and decrease risk of further injury or fall.  [x]  Progressing  [] Met  [] Not Met     HEP: Patient will demonstrate independence with HEP in order to progress toward functional independence. [x] Progressing  [] Met  [] Not Met        Long Term Goals:  12 weeks Status Date Met   PAIN: Pt will report worst pain of 2/10 in order to progress toward max functional ability and improve quality of life [x] Progressing  [] Met  [] Not Met     FUNCTION: Patient will demonstrate improved function as indicated by a score of greater than or equal to 83 out of 100 on FOTO. [x] Progressing  [] Met  [] Not Met     MOBILITY: Patient will improve AROM to stated goals, listed in objective measures above, in order to return to maximal functional potential and improve quality of life.  [x] Progressing  [] Met  [] Not Met     STRENGTH: Patient will improve strength to stated goals, listed in objective measures above, in order to improve functional independence and quality of life.  [x] Progressing  [] Met  [] Not Met     GAIT: Patient will demonstrate normalized gait mechanics with minimal compensation in order to return to PLOF. [x] Progressing  [] Met  [] Not Met     Patient will return to normal ADL's, IADL's, community involvement, recreational activities, and work-related activities with less than or equal to 2/10 pain and maximal function.  [x] Progressing  [] Met  [] Not Met          PLAN     Continue Plan of Care (POC) and progress per patient tolerance. See Treatment section for exercise progression.    Columba Daniel, PT    Disclaimer: This note was prepared using a voice recognition system and is likely to have sound alike errors within the text.

## 2024-09-06 NOTE — PROGRESS NOTES
"[facility]   Physical Therapy Treatment Note        Name: Zainab Mcgraw  Clinic Number: [id number]    Therapy Diagnosis:   Encounter Diagnoses   Name Primary?    Chronic pain of right knee Yes    Weakness of right lower extremity     Decreased range of motion of right knee      Physician: Tereza Hernandez, *    Visit Date: 9/6/2024    Physician Orders: PT Eval and Treat  Medical Diagnosis from Referral: Right knee pain, unspecified chronicity [M25.561]   Evaluation Date: 8/26/2024  Authorization Period Expiration: 8/19/2025  Plan of Care Expiration: 11/19/2024  Progress Note Due: 9/24/2024  Visit # / Visits authorized: 1/15   FOTO: 1/3 (last performed on 8/26/2024)     Precautions: Standard    PTA Visit #: 0/5     Time In: 9:00 AM  Time Out: 10:00 AM  Total Billable Time: 55 minutes    SUBJECTIVE     Pt reports:  She feels pretty good but she is very sore from her last visit yesterday.  Compliance with Hep: Daily  Response to previous treatment: no adverse reactions to treatment/updated HEP  Functional change: Better    Pain: 0/10   Worst: 8/10  Location: knee      OBJECTIVE     Objective Measures updated at progress report unless specified otherwise.      Treatment     Gym/Equipment Access: full  Time to Complete Exercises: increased for cueing and education    Lisa received the treatments listed below:      CPT Intervention Performed   Today Duration / Intensity   MT Patella MOBS x   5 minutes - long sit   TE Chondral Mobility:  x Upright Bike Level 4 -      Prone Knee Flexion X 30" holds, x5 each side.     Dynamic Warm X Hugs, sweeps, grabs     Seated Knee Ext matrix - DL  4x8  #25     Seated Knee flexion Matrix- SS4  x DL  4x8  #25     RDL with KB SS2- 3 rounds - #25 x15     Hip Thrust SS2- 3 rounds SS3 x #10 sand bag x15   NMR Lateral Walks SS4 - 3 rounds     1 lap 3 rounds      Side Planks SS3 - 3 rounds   x 30s  each side 3 rounds              TA Heel elevated Squats     " "   Step Downs  x 3x10 each leg 6 inch box     Wall Ball Squats with pause x 3-5s hold 30x Green ball      Sled Push SS1 - #70 - push & pull 1 lap x3 rounds     Wall Sits  SS1 - 30" x3 rounds   PLAN             CPT Codes available for Billing:   (5) minutes of Manual therapy (MT) to improve pain and ROM.  (30) minutes of Therapeutic Exercise (TE) to develop strength, endurance, range of motion, and flexibility.  (15) minutes of Neuromuscular Re-Education (NMR)  to improve: Balance, Coordination, Kinesthetic, Sense, Proprioception, and Posture.  (15) minutes of Therapeutic Activities (TA) to improve functional performance.  (0) minutes of Gait Training (GT) to improve functional gait mechanics.  (0) minutes of Self- Care and Education  Unattended Electrical Stimulation (ES) for muscle performance or pain modulation.  Vasopneumatic Device Therapy () for management of swelling/edema. (50933)  BFR: Blood flow restriction applied during exercise  NP or (-): Not Performed    See EMR under MEDIA for written consent provided for Dry Needling- DATE   Palpation Assessment to determine the necessity for Functional Dry Needling     PATIENT EDUCATION AND HOME EXERCISES      Education/Self-Care provided:  (included in treatment unless specified above) minutes   Patient educated on the impairments noted above and the effects of physical therapy intervention to improve overall condition and Quality of Life  Patient was educated on all the above exercise prior/during/after for proper posture, positioning, and execution for safe performance with home exercise program.      Written Home Exercises Provided: yes. Prefers: [x] Printed [x] Electronic  Exercises were reviewed and Rylee was able to demonstrate them prior to the end of the session.  Rylee demonstrated good understanding of the education provided. See EMR under Patient Instructions for exercises provided during therapy sessions.    ASSESSMENT     Lisa Mcgraw tolerated " Physical Therapy  session well with minimal  complaints of pain or discomfort.  Objective findings are improving with pain, range of motion, strength.  Lisa Mcgraw continues to have difficulty with heavy extensor chain resistance and loaded knee flexion in closed chain activities   Therapy exercises were reviewed by revisiting exercises given from previous home exercise program while adding circuit style exercises to focus on global exercise based strength work.  Handouts were not issued during today's visit. Zainab demonstrated good understanding of new exercises and will continue to progress at home until next follow-up.      Lisa Is progressing well towards her goals.   Pt prognosis is Good.     Pt will continue to benefit from skilled outpatient physical therapy to address the deficits listed in the problem list box on initial evaluation, provide pt/family education and to maximize pt's level of independence in the home and community environment.     Pt's spiritual, cultural and educational needs considered and pt agreeable to plan of care and goals.    Anticipated Barriers for therapy: adherence to treatment plan and coping style       Short Term Goals:  6 weeks Status  Date Met   PAIN: Pt will report worst pain of 6/10 in order to progress toward max functional ability and improve quality of life. [x] Progressing  [] Met  [] Not Met     FUNCTION: Patient will demonstrate improved function as indicated by a score of greater than or equal to 80 out of 100 on FOTO. [x] Progressing  [] Met  [] Not Met     MOBILITY: Patient will improve AROM to 50% of stated goals, listed in objective measures above, in order to progress towards independence with functional activities.  [x] Progressing  [] Met  [] Not Met     STRENGTH: Patient will improve strength to 50% of stated goals, listed in objective measures above, in order to progress towards independence with functional activities. [x] Progressing  [] Met  [] Not  Met     POSTURE: Patient will correct postural deviations in sitting and standing, to decrease pain and promote long term stability.  [x] Progressing  [] Met  [] Not Met     GAIT: Patient will demonstrate improved gait mechanics including improved step length and stance time in order to improve functional mobility, improve quality of life, and decrease risk of further injury or fall.  [x] Progressing  [] Met  [] Not Met     HEP: Patient will demonstrate independence with HEP in order to progress toward functional independence. [x] Progressing  [] Met  [] Not Met        Long Term Goals:  12 weeks Status Date Met   PAIN: Pt will report worst pain of 2/10 in order to progress toward max functional ability and improve quality of life [x] Progressing  [] Met  [] Not Met     FUNCTION: Patient will demonstrate improved function as indicated by a score of greater than or equal to 83 out of 100 on FOTO. [x] Progressing  [] Met  [] Not Met     MOBILITY: Patient will improve AROM to stated goals, listed in objective measures above, in order to return to maximal functional potential and improve quality of life.  [x] Progressing  [] Met  [] Not Met     STRENGTH: Patient will improve strength to stated goals, listed in objective measures above, in order to improve functional independence and quality of life.  [x] Progressing  [] Met  [] Not Met     GAIT: Patient will demonstrate normalized gait mechanics with minimal compensation in order to return to PLOF. [x] Progressing  [] Met  [] Not Met     Patient will return to normal ADL's, IADL's, community involvement, recreational activities, and work-related activities with less than or equal to 2/10 pain and maximal function.  [x] Progressing  [] Met  [] Not Met        Plan      Plan of care Certification: 8/26/2024 to 11/19/2024.    PLAN     Continue Plan of Care (POC) and progress per patient tolerance. See Treatment section for exercise progression.    Ruben Saleh  PT    Disclaimer: This note was prepared using a voice recognition system and is likely to have sound alike errors within the text.

## 2024-09-10 ENCOUNTER — CLINICAL SUPPORT (OUTPATIENT)
Dept: REHABILITATION | Facility: HOSPITAL | Age: 54
End: 2024-09-10
Payer: COMMERCIAL

## 2024-09-10 DIAGNOSIS — M25.661 DECREASED RANGE OF MOTION OF RIGHT KNEE: ICD-10-CM

## 2024-09-10 DIAGNOSIS — R29.898 WEAKNESS OF RIGHT LOWER EXTREMITY: ICD-10-CM

## 2024-09-10 DIAGNOSIS — G89.29 CHRONIC PAIN OF RIGHT KNEE: Primary | ICD-10-CM

## 2024-09-10 DIAGNOSIS — M25.561 CHRONIC PAIN OF RIGHT KNEE: Primary | ICD-10-CM

## 2024-09-10 PROCEDURE — 97112 NEUROMUSCULAR REEDUCATION: CPT

## 2024-09-10 PROCEDURE — 97530 THERAPEUTIC ACTIVITIES: CPT

## 2024-09-10 PROCEDURE — 97110 THERAPEUTIC EXERCISES: CPT

## 2024-09-10 NOTE — PROGRESS NOTES
"[facility]   Physical Therapy Treatment Note        Name: Zainab Mcgraw  Clinic Number: [id number]    Therapy Diagnosis:   Encounter Diagnoses   Name Primary?    Chronic pain of right knee Yes    Weakness of right lower extremity     Decreased range of motion of right knee      Physician: Tereza Hernandez, *    Visit Date: 9/10/2024    Physician Orders: PT Eval and Treat  Medical Diagnosis from Referral: Right knee pain, unspecified chronicity [M25.561]   Evaluation Date: 8/26/2024  Authorization Period Expiration: 8/19/2025  Plan of Care Expiration: 11/19/2024  Progress Note Due: 9/24/2024  Visit # / Visits authorized: 1/15   FOTO: 1/3 (last performed on 8/26/2024)     Precautions: Standard    PTA Visit #: 0/5     Time In: 1:00 PM  Time Out: 2:00 PM  Total Billable Time: 55 minutes    SUBJECTIVE     Pt reports:  She has been feeling okay she has been busy prepping for the upcoming weather.   Compliance with Hep: Daily  Response to previous treatment: no adverse reactions to treatment/updated HEP  Functional change: Better    Pain: 0/10   Worst: 8/10  Location: knee      OBJECTIVE     Objective Measures updated at progress report unless specified otherwise.      Treatment     Gym/Equipment Access: full  Time to Complete Exercises: increased for cueing and education    Lisa received the treatments listed below:      CPT Intervention Performed   Today Duration / Intensity   MT Patella MOBS x   5 minutes - long sit   TE Chondral Mobility:  x Upright Bike Level 4 -      Prone Knee Flexion X 30" holds, x5 each side.     Dynamic Warm X Hugs, sweeps, grabs     Seated Knee Ext matrix x DL  4x8  #25     Seated Knee flexion Matrix- SS4  x DL  4x8  #25     RDL with KB SS2- 3 rounds x #25 x15     Hip Thrust SS2- 3 rounds SS3 x #10 sand bag x15   NMR Lateral Walks SS4 - 3 rounds   -  1 lap 3 rounds      Side Planks SS3 - 3 rounds   - 30s  each side 3 rounds              TA Heel elevated " "Squats        Step Downs  x 3x10 each leg 6 inch box     Wall Ball Squats with pause x 3-5s hold 30x Green ball      Sled Push SS1 x #70 - push & pull 1 lap x3 rounds     Wall Sits  SS1 x 30" x3 rounds   PLAN             CPT Codes available for Billing:   (5) minutes of Manual therapy (MT) to improve pain and ROM.  (30) minutes of Therapeutic Exercise (TE) to develop strength, endurance, range of motion, and flexibility.  (15) minutes of Neuromuscular Re-Education (NMR)  to improve: Balance, Coordination, Kinesthetic, Sense, Proprioception, and Posture.  (15) minutes of Therapeutic Activities (TA) to improve functional performance.  (0) minutes of Gait Training (GT) to improve functional gait mechanics.  (0) minutes of Self- Care and Education  Unattended Electrical Stimulation (ES) for muscle performance or pain modulation.  Vasopneumatic Device Therapy () for management of swelling/edema. (99505)  BFR: Blood flow restriction applied during exercise  NP or (-): Not Performed    See EMR under MEDIA for written consent provided for Dry Needling- DATE   Palpation Assessment to determine the necessity for Functional Dry Needling     PATIENT EDUCATION AND HOME EXERCISES      Education/Self-Care provided:  (included in treatment unless specified above) minutes   Patient educated on the impairments noted above and the effects of physical therapy intervention to improve overall condition and Quality of Life  Patient was educated on all the above exercise prior/during/after for proper posture, positioning, and execution for safe performance with home exercise program.      Written Home Exercises Provided: yes. Prefers: [x] Printed [x] Electronic  Exercises were reviewed and Rylee was able to demonstrate them prior to the end of the session.  Rylee demonstrated good understanding of the education provided. See EMR under Patient Instructions for exercises provided during therapy sessions.    ASSESSMENT     Lisa Mcgraw " tolerated Physical Therapy  session well with minimal  complaints of pain or discomfort.  Objective findings are improving with pain, range of motion, strength.  Lisa Mcgraw continues to have difficulty with heavy extensor chain resistance and loaded knee flexion in closed chain activities. Continued circuit training focused on LE loading in both open and closed chain to progress her tolerance to loading her knee in various patterns. .  Handouts were not issued during today's visit. Zainab demonstrated good understanding of new exercises and will continue to progress at home until next follow-up.      Lisa Is progressing well towards her goals.   Pt prognosis is Good.     Pt will continue to benefit from skilled outpatient physical therapy to address the deficits listed in the problem list box on initial evaluation, provide pt/family education and to maximize pt's level of independence in the home and community environment.     Pt's spiritual, cultural and educational needs considered and pt agreeable to plan of care and goals.    Anticipated Barriers for therapy: adherence to treatment plan and coping style       Short Term Goals:  6 weeks Status  Date Met   PAIN: Pt will report worst pain of 6/10 in order to progress toward max functional ability and improve quality of life. [x] Progressing  [] Met  [] Not Met     FUNCTION: Patient will demonstrate improved function as indicated by a score of greater than or equal to 80 out of 100 on FOTO. [x] Progressing  [] Met  [] Not Met     MOBILITY: Patient will improve AROM to 50% of stated goals, listed in objective measures above, in order to progress towards independence with functional activities.  [x] Progressing  [] Met  [] Not Met     STRENGTH: Patient will improve strength to 50% of stated goals, listed in objective measures above, in order to progress towards independence with functional activities. [x] Progressing  [] Met  [] Not Met     POSTURE: Patient will  correct postural deviations in sitting and standing, to decrease pain and promote long term stability.  [x] Progressing  [] Met  [] Not Met     GAIT: Patient will demonstrate improved gait mechanics including improved step length and stance time in order to improve functional mobility, improve quality of life, and decrease risk of further injury or fall.  [x] Progressing  [] Met  [] Not Met     HEP: Patient will demonstrate independence with HEP in order to progress toward functional independence. [x] Progressing  [] Met  [] Not Met        Long Term Goals:  12 weeks Status Date Met   PAIN: Pt will report worst pain of 2/10 in order to progress toward max functional ability and improve quality of life [x] Progressing  [] Met  [] Not Met     FUNCTION: Patient will demonstrate improved function as indicated by a score of greater than or equal to 83 out of 100 on FOTO. [x] Progressing  [] Met  [] Not Met     MOBILITY: Patient will improve AROM to stated goals, listed in objective measures above, in order to return to maximal functional potential and improve quality of life.  [x] Progressing  [] Met  [] Not Met     STRENGTH: Patient will improve strength to stated goals, listed in objective measures above, in order to improve functional independence and quality of life.  [x] Progressing  [] Met  [] Not Met     GAIT: Patient will demonstrate normalized gait mechanics with minimal compensation in order to return to PLOF. [x] Progressing  [] Met  [] Not Met     Patient will return to normal ADL's, IADL's, community involvement, recreational activities, and work-related activities with less than or equal to 2/10 pain and maximal function.  [x] Progressing  [] Met  [] Not Met        Plan      Plan of care Certification: 8/26/2024 to 11/19/2024.    PLAN     Continue Plan of Care (POC) and progress per patient tolerance. See Treatment section for exercise progression.    Ruben Saleh, PT    Disclaimer: This note was  prepared using a voice recognition system and is likely to have sound alike errors within the text.

## 2024-09-12 ENCOUNTER — CLINICAL SUPPORT (OUTPATIENT)
Dept: REHABILITATION | Facility: HOSPITAL | Age: 54
End: 2024-09-12
Payer: COMMERCIAL

## 2024-09-12 DIAGNOSIS — M25.561 CHRONIC PAIN OF RIGHT KNEE: Primary | ICD-10-CM

## 2024-09-12 DIAGNOSIS — G89.29 CHRONIC PAIN OF RIGHT KNEE: Primary | ICD-10-CM

## 2024-09-12 DIAGNOSIS — R29.898 WEAKNESS OF RIGHT LOWER EXTREMITY: ICD-10-CM

## 2024-09-12 DIAGNOSIS — M25.661 DECREASED RANGE OF MOTION OF RIGHT KNEE: ICD-10-CM

## 2024-09-12 PROCEDURE — 97530 THERAPEUTIC ACTIVITIES: CPT

## 2024-09-12 PROCEDURE — 97112 NEUROMUSCULAR REEDUCATION: CPT

## 2024-09-12 PROCEDURE — 97110 THERAPEUTIC EXERCISES: CPT

## 2024-09-12 NOTE — PROGRESS NOTES
"[facility]   Physical Therapy Treatment Note        Name: Zainab Mcgraw  Clinic Number: [id number]    Therapy Diagnosis:   Encounter Diagnoses   Name Primary?    Chronic pain of right knee Yes    Weakness of right lower extremity     Decreased range of motion of right knee      Physician: Tereza Hernandez, *    Visit Date: 9/12/2024    Physician Orders: PT Eval and Treat  Medical Diagnosis from Referral: Right knee pain, unspecified chronicity [M25.561]   Evaluation Date: 8/26/2024  Authorization Period Expiration: 8/19/2025  Plan of Care Expiration: 11/19/2024  Progress Note Due: 9/24/2024  Visit # / Visits authorized: 5/15   FOTO: 1/3 (last performed on 8/26/2024)     Precautions: Standard    PTA Visit #: 0/5     Time In: 10:00 AM  Time Out: 11:00 AM  Total Billable Time: 55 minutes    SUBJECTIVE     Pt reports:  She is feeling good today, her knee has been feeling better and she is not having as much pain. She has been off her anti-inflammatories and is doing well.   Compliance with Hep: Daily  Response to previous treatment: no adverse reactions to treatment/updated HEP  Functional change: Better    Pain: 0/10   Worst: 8/10  Location: knee      OBJECTIVE     Objective Measures updated at progress report unless specified otherwise.      Treatment     Gym/Equipment Access: full  Time to Complete Exercises: increased for cueing and education    Lisa received the treatments listed below:      CPT Intervention Performed   Today Duration / Intensity   MT Patella MOBS x   5 minutes - long sit   TE Chondral Mobility:  x Upright Bike Level 4 -      Prone Knee Flexion X 30" holds, x5 each side.     Dynamic Warm X Hugs, sweeps, grabs     Seated Knee Ext matrix x DL  4x8  #55     Seated Knee flexion Matrix- SS4  x DL  4x8  #55     RDL with KB SS2- 3 rounds x #25 x15     Hip Thrust SS2- 3 rounds SS3 x #30 sand bag x15   NMR Lateral Walks SS4 - 3 rounds   x  1 lap 3 rounds      Side Planks " "SS3 - 3 rounds   x 30s  each side 3 rounds              TA Heel elevated Squats        Step Downs  x 3x12 each leg 6 inch box     Wall Ball Squats with pause x 3-5s hold 30x Green ball  20# Vest     Sled Push SS1 - #70 - push & pull 1 lap x3 rounds     Wall Sits  SS1 - 30" x3 rounds   PLAN             CPT Codes available for Billing:   (5) minutes of Manual therapy (MT) to improve pain and ROM.  (30) minutes of Therapeutic Exercise (TE) to develop strength, endurance, range of motion, and flexibility.  (15) minutes of Neuromuscular Re-Education (NMR)  to improve: Balance, Coordination, Kinesthetic, Sense, Proprioception, and Posture.  (15) minutes of Therapeutic Activities (TA) to improve functional performance.  (0) minutes of Gait Training (GT) to improve functional gait mechanics.  (0) minutes of Self- Care and Education  Unattended Electrical Stimulation (ES) for muscle performance or pain modulation.  Vasopneumatic Device Therapy () for management of swelling/edema. (55159)  BFR: Blood flow restriction applied during exercise  NP or (-): Not Performed    See EMR under MEDIA for written consent provided for Dry Needling- DATE   Palpation Assessment to determine the necessity for Functional Dry Needling     PATIENT EDUCATION AND HOME EXERCISES      Education/Self-Care provided:  (included in treatment unless specified above) minutes   Patient educated on the impairments noted above and the effects of physical therapy intervention to improve overall condition and Quality of Life  Patient was educated on all the above exercise prior/during/after for proper posture, positioning, and execution for safe performance with home exercise program.      Written Home Exercises Provided: yes. Prefers: [x] Printed [x] Electronic  Exercises were reviewed and Rylee was able to demonstrate them prior to the end of the session.  Rylee demonstrated good understanding of the education provided. See EMR under Patient Instructions " for exercises provided during therapy sessions.    ASSESSMENT     Lisa Mcgraw tolerated Physical Therapy  session well with no reports of pain or discomfort.  Objective findings are improving with pain, range of motion, strength.  Lisa Mcgraw continues has some difficulty with heavier loads into extension both open and closed chain . Continued circuit training focused on LE loading in both open and closed chain to progress her tolerance to loading her knee in various patterns.  Handouts were not issued during today's visit. Zainab demonstrated good understanding of new exercises and will continue to progress at home until next follow-up.      Lisa Is progressing well towards her goals.   Pt prognosis is Good.     Pt will continue to benefit from skilled outpatient physical therapy to address the deficits listed in the problem list box on initial evaluation, provide pt/family education and to maximize pt's level of independence in the home and community environment.     Pt's spiritual, cultural and educational needs considered and pt agreeable to plan of care and goals.    Anticipated Barriers for therapy: adherence to treatment plan and coping style       Short Term Goals:  6 weeks Status  Date Met   PAIN: Pt will report worst pain of 6/10 in order to progress toward max functional ability and improve quality of life. [x] Progressing  [] Met  [] Not Met     FUNCTION: Patient will demonstrate improved function as indicated by a score of greater than or equal to 80 out of 100 on FOTO. [x] Progressing  [] Met  [] Not Met     MOBILITY: Patient will improve AROM to 50% of stated goals, listed in objective measures above, in order to progress towards independence with functional activities.  [x] Progressing  [] Met  [] Not Met     STRENGTH: Patient will improve strength to 50% of stated goals, listed in objective measures above, in order to progress towards independence with functional activities. [x]  Progressing  [] Met  [] Not Met     POSTURE: Patient will correct postural deviations in sitting and standing, to decrease pain and promote long term stability.  [x] Progressing  [] Met  [] Not Met     GAIT: Patient will demonstrate improved gait mechanics including improved step length and stance time in order to improve functional mobility, improve quality of life, and decrease risk of further injury or fall.  [x] Progressing  [] Met  [] Not Met     HEP: Patient will demonstrate independence with HEP in order to progress toward functional independence. [x] Progressing  [] Met  [] Not Met        Long Term Goals:  12 weeks Status Date Met   PAIN: Pt will report worst pain of 2/10 in order to progress toward max functional ability and improve quality of life [x] Progressing  [] Met  [] Not Met     FUNCTION: Patient will demonstrate improved function as indicated by a score of greater than or equal to 83 out of 100 on FOTO. [x] Progressing  [] Met  [] Not Met     MOBILITY: Patient will improve AROM to stated goals, listed in objective measures above, in order to return to maximal functional potential and improve quality of life.  [x] Progressing  [] Met  [] Not Met     STRENGTH: Patient will improve strength to stated goals, listed in objective measures above, in order to improve functional independence and quality of life.  [x] Progressing  [] Met  [] Not Met     GAIT: Patient will demonstrate normalized gait mechanics with minimal compensation in order to return to PLOF. [x] Progressing  [] Met  [] Not Met     Patient will return to normal ADL's, IADL's, community involvement, recreational activities, and work-related activities with less than or equal to 2/10 pain and maximal function.  [x] Progressing  [] Met  [] Not Met        Plan      Plan of care Certification: 8/26/2024 to 11/19/2024.    PLAN     Continue Plan of Care (POC) and progress per patient tolerance. See Treatment section for exercise  progression.    Ruben Saleh, REMI    Disclaimer: This note was prepared using a voice recognition system and is likely to have sound alike errors within the text.

## 2024-09-15 DIAGNOSIS — M25.561 RIGHT KNEE PAIN, UNSPECIFIED CHRONICITY: ICD-10-CM

## 2024-09-16 RX ORDER — NAPROXEN 500 MG/1
500 TABLET ORAL 2 TIMES DAILY
Qty: 60 TABLET | Refills: 0 | Status: SHIPPED | OUTPATIENT
Start: 2024-09-16

## 2024-09-24 ENCOUNTER — CLINICAL SUPPORT (OUTPATIENT)
Dept: REHABILITATION | Facility: HOSPITAL | Age: 54
End: 2024-09-24
Payer: COMMERCIAL

## 2024-09-24 DIAGNOSIS — M25.661 DECREASED RANGE OF MOTION OF RIGHT KNEE: ICD-10-CM

## 2024-09-24 DIAGNOSIS — G89.29 CHRONIC PAIN OF RIGHT KNEE: Primary | ICD-10-CM

## 2024-09-24 DIAGNOSIS — R29.898 WEAKNESS OF RIGHT LOWER EXTREMITY: ICD-10-CM

## 2024-09-24 DIAGNOSIS — M25.561 CHRONIC PAIN OF RIGHT KNEE: Primary | ICD-10-CM

## 2024-09-24 PROCEDURE — 97112 NEUROMUSCULAR REEDUCATION: CPT

## 2024-09-24 PROCEDURE — 97110 THERAPEUTIC EXERCISES: CPT

## 2024-09-24 PROCEDURE — 97530 THERAPEUTIC ACTIVITIES: CPT

## 2024-09-24 NOTE — PROGRESS NOTES
"[facility]   Physical Therapy Treatment Note        Name: Zainab Mcgraw  Clinic Number: [id number]    Therapy Diagnosis:   Encounter Diagnoses   Name Primary?    Chronic pain of right knee Yes    Weakness of right lower extremity     Decreased range of motion of right knee      Physician: Tereza Hernandez, *    Visit Date: 9/24/2024    Physician Orders: PT Eval and Treat  Medical Diagnosis from Referral: Right knee pain, unspecified chronicity [M25.561]   Evaluation Date: 8/26/2024  Authorization Period Expiration: 8/19/2025  Plan of Care Expiration: 11/19/2024  Progress Note Due: 9/24/2024  Visit # / Visits authorized: 6/15   FOTO: 1/3 (last performed on 8/26/2024)     Precautions: Standard    PTA Visit #: 0/5     Time In: 11:00 AM  Time Out: 12:00 PM  Total Billable Time: 55 minutes    SUBJECTIVE     Pt reports:  She is feeling good today, She has been able to do her child pose without pain as of late.   Compliance with Hep: Daily  Response to previous treatment: no adverse reactions to treatment/updated HEP  Functional change: Better    Pain: 0/10   Worst: 8/10  Location: knee      OBJECTIVE     Objective Measures updated at progress report unless specified otherwise.      Treatment     Gym/Equipment Access: full  Time to Complete Exercises: increased for cueing and education    Lisa received the treatments listed below:      CPT Intervention Performed   Today Duration / Intensity   MT Patella MOBS x   5 minutes - long sit   TE Chondral Mobility:  x Elliptical 6 minutes      Prone Knee Flexion X 30" holds, x5 each side.     Dynamic Warm X Hugs, sweeps, grabs     Seated Knee Ext matrix x DL  4x8  #55     Seated Knee flexion Matrix- SS4  x DL  4x8  #55     RDL with KB SS2- 3 rounds x #25 x15     Hip Thrust SS2- 3 rounds SS3 x #30 sand bag x15   NMR Lateral Walks SS4 - 3 rounds   x  1 lap 3 rounds      Side Planks SS3 - 3 rounds   x 30s  each side 3 rounds              TA Heel " "elevated Squats        Step Downs  x 3x12 each leg 6 inch box     Wall Ball Squats with pause x 3-5s hold 30x Green ball  20# Vest     Sled Push SS1 - #70 - push & pull 1 lap x3 rounds     Wall Sits  SS1 - 30" x3 rounds   PLAN             CPT Codes available for Billing:   (5) minutes of Manual therapy (MT) to improve pain and ROM.  (30) minutes of Therapeutic Exercise (TE) to develop strength, endurance, range of motion, and flexibility.  (15) minutes of Neuromuscular Re-Education (NMR)  to improve: Balance, Coordination, Kinesthetic, Sense, Proprioception, and Posture.  (15) minutes of Therapeutic Activities (TA) to improve functional performance.  (0) minutes of Gait Training (GT) to improve functional gait mechanics.  (0) minutes of Self- Care and Education  Unattended Electrical Stimulation (ES) for muscle performance or pain modulation.  Vasopneumatic Device Therapy () for management of swelling/edema. (57290)  BFR: Blood flow restriction applied during exercise  NP or (-): Not Performed    See EMR under MEDIA for written consent provided for Dry Needling- DATE   Palpation Assessment to determine the necessity for Functional Dry Needling     PATIENT EDUCATION AND HOME EXERCISES      Education/Self-Care provided:  (included in treatment unless specified above) minutes   Patient educated on the impairments noted above and the effects of physical therapy intervention to improve overall condition and Quality of Life  Patient was educated on all the above exercise prior/during/after for proper posture, positioning, and execution for safe performance with home exercise program.      Written Home Exercises Provided: yes. Prefers: [x] Printed [x] Electronic  Exercises were reviewed and Rylee was able to demonstrate them prior to the end of the session.  Rylee demonstrated good understanding of the education provided. See EMR under Patient Instructions for exercises provided during therapy sessions.    ASSESSMENT "     Lisa Mcgraw tolerated Physical Therapy  session well with no reports of pain or discomfort.  Objective findings are improving with pain, range of motion, strength.  Lisa Mcgraw continues to have difficulty with higher level activities. Continued circuit training focused on LE loading in both open and closed chain to progress her tolerance to loading her knee in various patterns.  Handouts were not issued during today's visit. Zainab demonstrated good understanding of new exercises and will continue to progress at home until next follow-up.      Lisa Is progressing well towards her goals.   Pt prognosis is Good.     Pt will continue to benefit from skilled outpatient physical therapy to address the deficits listed in the problem list box on initial evaluation, provide pt/family education and to maximize pt's level of independence in the home and community environment.     Pt's spiritual, cultural and educational needs considered and pt agreeable to plan of care and goals.    Anticipated Barriers for therapy: adherence to treatment plan and coping style       Short Term Goals:  6 weeks Status  Date Met   PAIN: Pt will report worst pain of 6/10 in order to progress toward max functional ability and improve quality of life. [x] Progressing  [] Met  [] Not Met     FUNCTION: Patient will demonstrate improved function as indicated by a score of greater than or equal to 80 out of 100 on FOTO. [x] Progressing  [] Met  [] Not Met     MOBILITY: Patient will improve AROM to 50% of stated goals, listed in objective measures above, in order to progress towards independence with functional activities.  [x] Progressing  [] Met  [] Not Met     STRENGTH: Patient will improve strength to 50% of stated goals, listed in objective measures above, in order to progress towards independence with functional activities. [x] Progressing  [] Met  [] Not Met     POSTURE: Patient will correct postural deviations in sitting and standing,  to decrease pain and promote long term stability.  [x] Progressing  [] Met  [] Not Met     GAIT: Patient will demonstrate improved gait mechanics including improved step length and stance time in order to improve functional mobility, improve quality of life, and decrease risk of further injury or fall.  [x] Progressing  [] Met  [] Not Met     HEP: Patient will demonstrate independence with HEP in order to progress toward functional independence. [x] Progressing  [] Met  [] Not Met        Long Term Goals:  12 weeks Status Date Met   PAIN: Pt will report worst pain of 2/10 in order to progress toward max functional ability and improve quality of life [x] Progressing  [] Met  [] Not Met     FUNCTION: Patient will demonstrate improved function as indicated by a score of greater than or equal to 83 out of 100 on FOTO. [x] Progressing  [] Met  [] Not Met     MOBILITY: Patient will improve AROM to stated goals, listed in objective measures above, in order to return to maximal functional potential and improve quality of life.  [x] Progressing  [] Met  [] Not Met     STRENGTH: Patient will improve strength to stated goals, listed in objective measures above, in order to improve functional independence and quality of life.  [x] Progressing  [] Met  [] Not Met     GAIT: Patient will demonstrate normalized gait mechanics with minimal compensation in order to return to PLOF. [x] Progressing  [] Met  [] Not Met     Patient will return to normal ADL's, IADL's, community involvement, recreational activities, and work-related activities with less than or equal to 2/10 pain and maximal function.  [x] Progressing  [] Met  [] Not Met        Plan      Plan of care Certification: 8/26/2024 to 11/19/2024.    PLAN     Continue Plan of Care (POC) and progress per patient tolerance. See Treatment section for exercise progression.    Ruben Saleh, PT    Disclaimer: This note was prepared using a voice recognition system and is likely to  have sound alike errors within the text.

## 2024-09-26 ENCOUNTER — CLINICAL SUPPORT (OUTPATIENT)
Dept: REHABILITATION | Facility: HOSPITAL | Age: 54
End: 2024-09-26
Payer: COMMERCIAL

## 2024-09-26 DIAGNOSIS — G89.29 CHRONIC PAIN OF RIGHT KNEE: Primary | ICD-10-CM

## 2024-09-26 DIAGNOSIS — M25.561 CHRONIC PAIN OF RIGHT KNEE: Primary | ICD-10-CM

## 2024-09-26 DIAGNOSIS — M25.661 DECREASED RANGE OF MOTION OF RIGHT KNEE: ICD-10-CM

## 2024-09-26 DIAGNOSIS — R29.898 WEAKNESS OF RIGHT LOWER EXTREMITY: ICD-10-CM

## 2024-09-26 PROCEDURE — 97110 THERAPEUTIC EXERCISES: CPT

## 2024-09-26 PROCEDURE — 97112 NEUROMUSCULAR REEDUCATION: CPT

## 2024-09-26 PROCEDURE — 97530 THERAPEUTIC ACTIVITIES: CPT

## 2024-09-26 NOTE — PROGRESS NOTES
[facility]   Physical Therapy Treatment Note + Progress Note        Name: Zainab Mcgraw  Clinic Number: [id number]    Therapy Diagnosis:   Encounter Diagnoses   Name Primary?    Chronic pain of right knee Yes    Weakness of right lower extremity     Decreased range of motion of right knee      Physician: Tereza Hernandez, *    Visit Date: 9/26/2024    Physician Orders: PT Eval and Treat  Medical Diagnosis from Referral: Right knee pain, unspecified chronicity [M25.561]   Evaluation Date: 8/26/2024  Authorization Period Expiration: 8/19/2025  Plan of Care Expiration: 11/19/2024  Progress Note Due:  10/24/2024  Visit # / Visits authorized: 7/15   FOTO: 1/3 (last performed on 8/26/2024)     Precautions: Standard    PTA Visit #: 0/5     Time In: 11:00 AM  Time Out: 12:00 PM  Total Billable Time: 55 minutes    SUBJECTIVE     Pt reports: Since the start of therapy she has been feeling a lot better and has been back to playing pickle ball at about 50%. She reports minimal pain on the day to day and decreased episodes of popping. She did have a fall the other day due to her shoe catching the ground but was wearing her brace and only had minimal pain increased following this.   Compliance with Hep: Daily  Response to previous treatment: no adverse reactions to treatment/updated HEP  Functional change: Better    Pain: 0/10   Worst: 8/10  Location: knee      OBJECTIVE     Objective Measures updated at progress report unless specified otherwise.  RANGE OF MOTION:   Knee AROM/PROM Right    Left    Goal   Hyper - Zero - Flexion  4-0-145 5-0-145 5-0-145   **Pain with end range flexion and extension         STRENGTH:   L/E MMT Right  (spine) Left Pain/Dysfunction with Movement Goal   Modified (90/90) Abdominal Strength  good ---       Hip Flexion  5/5 5/5   5/5 B   Hip Extension  4+/5 5/5   5/5 B   Hip Abduction  4/5 4/5   5/5 B   Knee Extension 5/5 5/5   5/5 B   Knee Flexion 5/5 5/5   5/5 B   Hip  IR 5/5 5/5 5/5 B   Hip ER 5/5 5/5 5/5 B   Ankle DF 5/5 5/5 5/5 B   Ankle PF 5/5 5/5 5/5 B   Ankle Inversion 5/5 5/5 5/5 B   Ankle Eversion 5/5 5/5   5/5 B      L/E Dynamometer Right    Left Pain/Dysfunction with Movement LSI Goal   Knee Extension  47.6 lbs  74.4lbs 71.2 lbs  83.8 lbs Pain in the Right knee  66%  88% 90%         MUSCLE LENGTH:   Muscle Tested  Right Left  Limitation Goal   Quadriceps [x] Normal  [] Limited [x] Normal  [] Limited   Normal B         SPECIAL TESTS:     Right  (spine) Left  Goal   Lachman Test [] Positive     [x] Negative [] Positive     [x] Negative Negative B    Anterior Drawer  [] Positive     [x] Negative [] Positive     [x] Negative Negative B    Posterior Drawer [] Positive     [x] Negative [] Positive     [x] Negative Negative B    Pivot-Shift Test [] Positive     [x] Negative [] Positive     [x] Negative Negative B    Varus Stress Test [] Positive     [x] Negative [] Positive     [x] Negative Negative B    Valgus Stress Test [] Positive     [x] Negative [] Positive     [x] Negative Negative B    Michelle Test [x] Positive     [] Negative [] Positive     [x] Negative Negative B    Thessaly  [x] Positive     [] Negative [] Positive     [x] Negative Negative B            GAIT ANALYSIS The patient ambulated with the following assistive device: none; the pt presents with the following gait abnormalities: No notable Gait abnormalities     FUNCTIONAL MOVEMENT PATTERNS        Movement Analysis Notes   Bed Mobility  [x]Functional  []Dysfunctional:  []Painful  [x]Non-Painful     Transfers [x]Functional  []Dysfunctional:  []Painful  [x]Non-Painful     Sit to Stand [x]Functional  []Dysfunctional:  []Painful  [x]Non-Painful     Squat [x]Functional  []Dysfunctional:  []Painful  [x]Non-Painful  Decreased pain with normal squat depth, slight bias for left leg but can self correct   Single Leg Squat  [x]Functional  []Dysfunctional:  []Painful  [x]Non-Painful     Step Down   "[x]Functional  []Dysfunctional:  []Painful  [x]Non-Painful              Function:      Intake Outcome Measure for FOTO Knee Survey     Therapist reviewed FOTO scores for Lisa on 9/26/2024.   FOTO report - see Media section or FOTO account for episode details     Goal  Score: 91%         Treatment     Gym/Equipment Access: full  Time to Complete Exercises: increased for cueing and education    Lisa received the treatments listed below:      CPT Intervention Performed   Today Duration / Intensity   MT Patella MOBS x   5 minutes - long sit   TE Chondral Mobility:  x Elliptical 6 minutes      Prone Knee Flexion X 30" holds, x5 each side.     Dynamic Warm X Hugs, sweeps, grabs     Seated Knee Ext matrix x DL  4x8  #55     Seated Knee flexion Matrix- SS4  x DL  4x8  #55     RDL with KB SS2- 3 rounds x #25 x15     Hip Thrust SS2- 3 rounds SS3 x #30 sand bag x15   NMR Lateral Walks SS4 - 3 rounds   x  1 lap 3 rounds      Side Planks SS3 - 3 rounds   x 30s  each side 3 rounds              TA Heel elevated Squats        Step Downs  x 3x12 each leg 6 inch box     Wall Ball Squats with pause x 3-5s hold 30x Green ball  20# Vest     Sled Push SS1 - #70 - push & pull 1 lap x3 rounds     Wall Sits  SS1 - 30" x3 rounds   PLAN             CPT Codes available for Billing:   (5) minutes of Manual therapy (MT) to improve pain and ROM.  (30) minutes of Therapeutic Exercise (TE) to develop strength, endurance, range of motion, and flexibility.  (15) minutes of Neuromuscular Re-Education (NMR)  to improve: Balance, Coordination, Kinesthetic, Sense, Proprioception, and Posture.  (15) minutes of Therapeutic Activities (TA) to improve functional performance.  (0) minutes of Gait Training (GT) to improve functional gait mechanics.  (0) minutes of Self- Care and Education  Unattended Electrical Stimulation (ES) for muscle performance or pain modulation.  Vasopneumatic Device Therapy () for management of swelling/edema. (09760)  BFR: " Blood flow restriction applied during exercise  NP or (-): Not Performed    See EMR under MEDIA for written consent provided for Dry Needling- DATE   Palpation Assessment to determine the necessity for Functional Dry Needling     PATIENT EDUCATION AND HOME EXERCISES      Education/Self-Care provided:  (included in treatment unless specified above) minutes   Patient educated on the impairments noted above and the effects of physical therapy intervention to improve overall condition and Quality of Life  Patient was educated on all the above exercise prior/during/after for proper posture, positioning, and execution for safe performance with home exercise program.      Written Home Exercises Provided: yes. Prefers: [x] Printed [x] Electronic  Exercises were reviewed and Rylee was able to demonstrate them prior to the end of the session.  Rylee demonstrated good understanding of the education provided. See EMR under Patient Instructions for exercises provided during therapy sessions.    ASSESSMENT     Since the start of therapy Mrs. Gonzalez has made significant improvements in LE strength, including LSI, and knee ROM. Patient has returned to her normal ADL's and slowly is reintegrating higher level activities like pickle ball with minimal reports of pain but with restrictions. At this time she would continued to benefit from skilled PT to promote return to her higher level activities to improve QOL and community participation.     Lisa Is progressing well towards her goals.   Pt prognosis is Good.     Pt will continue to benefit from skilled outpatient physical therapy to address the deficits listed in the problem list box on initial evaluation, provide pt/family education and to maximize pt's level of independence in the home and community environment.     Pt's spiritual, cultural and educational needs considered and pt agreeable to plan of care and goals.    Anticipated Barriers for therapy: adherence to treatment plan  and coping style       Short Term Goals:  6 weeks Status  Date Met   PAIN: Pt will report worst pain of 6/10 in order to progress toward max functional ability and improve quality of life. [] Progressing  [x] Met  [] Not Met 9/26/2024    FUNCTION: Patient will demonstrate improved function as indicated by a score of greater than or equal to 80 out of 100 on FOTO. [] Progressing  [x] Met  [] Not Met 9/26/2024    MOBILITY: Patient will improve AROM to 50% of stated goals, listed in objective measures above, in order to progress towards independence with functional activities.  [] Progressing  [x] Met  [] Not Met 9/26/2024    STRENGTH: Patient will improve strength to 50% of stated goals, listed in objective measures above, in order to progress towards independence with functional activities. [] Progressing  [x] Met  [] Not Met 9/26/2024    POSTURE: Patient will correct postural deviations in sitting and standing, to decrease pain and promote long term stability.  [] Progressing  [x] Met  [] Not Met 9/26/2024    GAIT: Patient will demonstrate improved gait mechanics including improved step length and stance time in order to improve functional mobility, improve quality of life, and decrease risk of further injury or fall.  [] Progressing  [x] Met  [] Not Met 9/26/2024    HEP: Patient will demonstrate independence with HEP in order to progress toward functional independence. [] Progressing  [x] Met  [] Not Met 9/26/2024       Long Term Goals:  12 weeks Status Date Met   PAIN: Pt will report worst pain of 2/10 in order to progress toward max functional ability and improve quality of life [x] Progressing  [] Met  [] Not Met     FUNCTION: Patient will demonstrate improved function as indicated by a score of greater than or equal to 83 out of 100 on FOTO. [] Progressing  [x] Met  [] Not Met  9/26/2024   MOBILITY: Patient will improve AROM to stated goals, listed in objective measures above, in order to return to maximal  functional potential and improve quality of life.  [x] Progressing  [] Met  [] Not Met     STRENGTH: Patient will improve strength to stated goals, listed in objective measures above, in order to improve functional independence and quality of life.  [x] Progressing  [] Met  [] Not Met     GAIT: Patient will demonstrate normalized gait mechanics with minimal compensation in order to return to PLOF. [] Progressing  [x] Met  [] Not Met  9/26/2024   Patient will return to normal ADL's, IADL's, community involvement, recreational activities, and work-related activities with less than or equal to 2/10 pain and maximal function.  [x] Progressing  [] Met  [] Not Met        Plan      Plan of care Certification: 8/26/2024 to 11/19/2024.    PLAN     Continue Plan of Care (POC) and progress per patient tolerance. See Treatment section for exercise progression.    Ruben Saleh, REMI    Disclaimer: This note was prepared using a voice recognition system and is likely to have sound alike errors within the text.

## 2024-10-03 ENCOUNTER — CLINICAL SUPPORT (OUTPATIENT)
Dept: REHABILITATION | Facility: HOSPITAL | Age: 54
End: 2024-10-03
Payer: COMMERCIAL

## 2024-10-03 DIAGNOSIS — M25.561 CHRONIC PAIN OF RIGHT KNEE: Primary | ICD-10-CM

## 2024-10-03 DIAGNOSIS — G89.29 CHRONIC PAIN OF RIGHT KNEE: Primary | ICD-10-CM

## 2024-10-03 DIAGNOSIS — R29.898 WEAKNESS OF RIGHT LOWER EXTREMITY: ICD-10-CM

## 2024-10-03 DIAGNOSIS — M25.661 DECREASED RANGE OF MOTION OF RIGHT KNEE: ICD-10-CM

## 2024-10-03 PROCEDURE — 97112 NEUROMUSCULAR REEDUCATION: CPT

## 2024-10-03 PROCEDURE — 97530 THERAPEUTIC ACTIVITIES: CPT

## 2024-10-03 PROCEDURE — 97110 THERAPEUTIC EXERCISES: CPT

## 2024-10-03 NOTE — PROGRESS NOTES
"[facility]   Physical Therapy Treatment Note + Progress Note        Name: Zainab Mcgraw  Clinic Number: [id number]    Therapy Diagnosis:   Encounter Diagnoses   Name Primary?    Chronic pain of right knee Yes    Weakness of right lower extremity     Decreased range of motion of right knee      Physician: Tereza Hernandez, *    Visit Date: 10/3/2024    Physician Orders: PT Eval and Treat  Medical Diagnosis from Referral: Right knee pain, unspecified chronicity [M25.561]   Evaluation Date: 8/26/2024  Authorization Period Expiration: 8/19/2025  Plan of Care Expiration: 11/19/2024  Progress Note Due:  10/24/2024  Visit # / Visits authorized: 7/15   FOTO: 1/3 (last performed on 8/26/2024)     Precautions: Standard    PTA Visit #: 0/5     Time In: 11:00 AM  Time Out: 12:00 PM  Total Billable Time: 55 minutes    SUBJECTIVE     Pt reports: She had some instability feelings in her knee while she walking [place] the beach, but she also was not wearing her brace. Overall she is happy with how it held up and how it felt .   Compliance with Hep: Daily  Response to previous treatment: no adverse reactions to treatment/updated HEP  Functional change: Better    Pain: 0/10   Worst: 8/10  Location: knee      OBJECTIVE     Objective Measures updated at progress report unless specified otherwise.       Treatment     Gym/Equipment Access: full  Time to Complete Exercises: increased for cueing and education    Lisa received the treatments listed below:      CPT Intervention Performed   Today Duration / Intensity   MT Patella MOBS x   5 minutes - long sit   TE Chondral Mobility:  x Elliptical 6 minutes      Prone Knee Flexion X 30" holds, x5 each side.     Dynamic Warm X Hugs, sweeps, grabs     Seated Knee Ext matrix x DL  4x8  #55     Seated Knee flexion Matrix- SS4  x DL  4x8  #55     RDL with KB SS2- 3 rounds x #25 x15     Hip Thrust SS2- 3 rounds SS3 x #30 sand bag x15   NMR Lateral Walks SS4 - 3 rounds " "  x  1 lap 3 rounds      Side Planks SS3 - 3 rounds   x 30s  each side 3 rounds              TA Heel elevated Squats        Step Downs  x 3x12 each leg 6 inch box     Wall Ball Squats with pause x 3-5s hold 30x Green ball  20# Vest     Sled Push SS1 - #70 - push & pull 1 lap x3 rounds     Wall Sits  SS1 - 30" x3 rounds   PLAN             CPT Codes available for Billing:   (5) minutes of Manual therapy (MT) to improve pain and ROM.  (30) minutes of Therapeutic Exercise (TE) to develop strength, endurance, range of motion, and flexibility.  (15) minutes of Neuromuscular Re-Education (NMR)  to improve: Balance, Coordination, Kinesthetic, Sense, Proprioception, and Posture.  (15) minutes of Therapeutic Activities (TA) to improve functional performance.  (0) minutes of Gait Training (GT) to improve functional gait mechanics.  (0) minutes of Self- Care and Education  Unattended Electrical Stimulation (ES) for muscle performance or pain modulation.  Vasopneumatic Device Therapy () for management of swelling/edema. (13015)  BFR: Blood flow restriction applied during exercise  NP or (-): Not Performed    See EMR under MEDIA for written consent provided for Dry Needling- DATE   Palpation Assessment to determine the necessity for Functional Dry Needling     PATIENT EDUCATION AND HOME EXERCISES      Education/Self-Care provided:  (included in treatment unless specified above) minutes   Patient educated on the impairments noted above and the effects of physical therapy intervention to improve overall condition and Quality of Life  Patient was educated on all the above exercise prior/during/after for proper posture, positioning, and execution for safe performance with home exercise program.      Written Home Exercises Provided: yes. Prefers: [x] Printed [x] Electronic  Exercises were reviewed and Rylee was able to demonstrate them prior to the end of the session.  Rylee demonstrated good understanding of the education provided. " See EMR under Patient Instructions for exercises provided during therapy sessions.    ASSESSMENT     Patient with good tolerance to this session continued generalized lower extremity strengthening and overall progression of loading her hip in multiplanar directions. Patient continues to progress well with minimal reports of pain or dysfunction.     Lisa Is progressing well towards her goals.   Pt prognosis is Good.     Pt will continue to benefit from skilled outpatient physical therapy to address the deficits listed in the problem list box on initial evaluation, provide pt/family education and to maximize pt's level of independence in the home and community environment.     Pt's spiritual, cultural and educational needs considered and pt agreeable to plan of care and goals.    Anticipated Barriers for therapy: adherence to treatment plan and coping style       Short Term Goals:  6 weeks Status  Date Met   PAIN: Pt will report worst pain of 6/10 in order to progress toward max functional ability and improve quality of life. [] Progressing  [x] Met  [] Not Met 9/26/2024    FUNCTION: Patient will demonstrate improved function as indicated by a score of greater than or equal to 80 out of 100 on FOTO. [] Progressing  [x] Met  [] Not Met 9/26/2024    MOBILITY: Patient will improve AROM to 50% of stated goals, listed in objective measures above, in order to progress towards independence with functional activities.  [] Progressing  [x] Met  [] Not Met 9/26/2024    STRENGTH: Patient will improve strength to 50% of stated goals, listed in objective measures above, in order to progress towards independence with functional activities. [] Progressing  [x] Met  [] Not Met 9/26/2024    POSTURE: Patient will correct postural deviations in sitting and standing, to decrease pain and promote long term stability.  [] Progressing  [x] Met  [] Not Met 9/26/2024    GAIT: Patient will demonstrate improved gait mechanics including  improved step length and stance time in order to improve functional mobility, improve quality of life, and decrease risk of further injury or fall.  [] Progressing  [x] Met  [] Not Met 9/26/2024    HEP: Patient will demonstrate independence with HEP in order to progress toward functional independence. [] Progressing  [x] Met  [] Not Met 9/26/2024       Long Term Goals:  12 weeks Status Date Met   PAIN: Pt will report worst pain of 2/10 in order to progress toward max functional ability and improve quality of life [x] Progressing  [] Met  [] Not Met     FUNCTION: Patient will demonstrate improved function as indicated by a score of greater than or equal to 83 out of 100 on FOTO. [] Progressing  [x] Met  [] Not Met  9/26/2024   MOBILITY: Patient will improve AROM to stated goals, listed in objective measures above, in order to return to maximal functional potential and improve quality of life.  [x] Progressing  [] Met  [] Not Met     STRENGTH: Patient will improve strength to stated goals, listed in objective measures above, in order to improve functional independence and quality of life.  [x] Progressing  [] Met  [] Not Met     GAIT: Patient will demonstrate normalized gait mechanics with minimal compensation in order to return to PLOF. [] Progressing  [x] Met  [] Not Met  9/26/2024   Patient will return to normal ADL's, IADL's, community involvement, recreational activities, and work-related activities with less than or equal to 2/10 pain and maximal function.  [x] Progressing  [] Met  [] Not Met        Plan      Plan of care Certification: 8/26/2024 to 11/19/2024.    PLAN     Continue Plan of Care (POC) and progress per patient tolerance. See Treatment section for exercise progression.    Ruben Saleh, PT    Disclaimer: This note was prepared using a voice recognition system and is likely to have sound alike errors within the text.

## 2024-10-08 ENCOUNTER — CLINICAL SUPPORT (OUTPATIENT)
Dept: REHABILITATION | Facility: HOSPITAL | Age: 54
End: 2024-10-08
Payer: COMMERCIAL

## 2024-10-08 DIAGNOSIS — M25.661 DECREASED RANGE OF MOTION OF RIGHT KNEE: ICD-10-CM

## 2024-10-08 DIAGNOSIS — G89.29 CHRONIC PAIN OF RIGHT KNEE: Primary | ICD-10-CM

## 2024-10-08 DIAGNOSIS — M25.561 CHRONIC PAIN OF RIGHT KNEE: Primary | ICD-10-CM

## 2024-10-08 DIAGNOSIS — R29.898 WEAKNESS OF RIGHT LOWER EXTREMITY: ICD-10-CM

## 2024-10-08 PROCEDURE — 97110 THERAPEUTIC EXERCISES: CPT

## 2024-10-08 PROCEDURE — 97530 THERAPEUTIC ACTIVITIES: CPT

## 2024-10-08 PROCEDURE — 97112 NEUROMUSCULAR REEDUCATION: CPT
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
## 2024-10-10 ENCOUNTER — CLINICAL SUPPORT (OUTPATIENT)
Dept: REHABILITATION | Facility: HOSPITAL | Age: 54
End: 2024-10-10
Payer: COMMERCIAL

## 2024-10-10 DIAGNOSIS — M25.661 DECREASED RANGE OF MOTION OF RIGHT KNEE: ICD-10-CM

## 2024-10-10 DIAGNOSIS — G89.29 CHRONIC PAIN OF RIGHT KNEE: Primary | ICD-10-CM

## 2024-10-10 DIAGNOSIS — R29.898 WEAKNESS OF RIGHT LOWER EXTREMITY: ICD-10-CM

## 2024-10-10 DIAGNOSIS — M25.561 CHRONIC PAIN OF RIGHT KNEE: Primary | ICD-10-CM

## 2024-10-10 PROCEDURE — 97112 NEUROMUSCULAR REEDUCATION: CPT

## 2024-10-10 PROCEDURE — 97110 THERAPEUTIC EXERCISES: CPT

## 2024-10-10 PROCEDURE — 97530 THERAPEUTIC ACTIVITIES: CPT

## 2024-10-10 NOTE — PROGRESS NOTES
"[facility]   Physical Therapy Treatment Note + Progress Note        Name: Zainab Mcgraw  Clinic Number: [id number]    Therapy Diagnosis:   Encounter Diagnoses   Name Primary?    Chronic pain of right knee Yes    Weakness of right lower extremity     Decreased range of motion of right knee      Physician: Tereza Hernandez, *    Visit Date: 10/10/2024    Physician Orders: PT Eval and Treat  Medical Diagnosis from Referral: Right knee pain, unspecified chronicity [M25.561]   Evaluation Date: 8/26/2024  Authorization Period Expiration: 8/19/2025  Plan of Care Expiration: 11/19/2024  Progress Note Due:10/24/2024  Visit # / Visits authorized: 7/15   FOTO: 1/3 (last performed on 8/26/2024)     Precautions: Standard    PTA Visit #: 0/5     Time In: 11:00 AM  Time Out: 12:00 PM  Total Billable Time: 55 minutes    SUBJECTIVE     Pt reports: She has been playing pickle ball at low level and knee has been feeling good.   Compliance with Hep: Daily  Response to previous treatment: no adverse reactions to treatment/updated HEP  Functional change: Better    Pain: 0/10   Worst: 8/10  Location: knee      OBJECTIVE     Objective Measures updated at progress report unless specified otherwise.       Treatment     Gym/Equipment Access: full  Time to Complete Exercises: increased for cueing and education    Lisa received the treatments listed below:      CPT Intervention Performed   Today Duration / Intensity   MT Patella MOBS -  5 minutes - long sit    Lumbar and Thoracic HVLAT - 5 minutes    TE Chondral Mobility:  x Elliptical 6 minutes      Prone Knee Flexion X 30" holds, x5 each side.     Dynamic Warm X Hugs, sweeps, grabs     Seated Knee Ext matrix x DL  4x8  #55     Seated Knee flexion Matrix-  x DL  4x8  #55     RDL with KB SS2- 3 rounds x #25 x15     Hip Thrust SS2- 3 rounds SS3 x #30 sand bag x15   NMR Lateral Walks SS4 - 3 rounds   x  1 lap 3 rounds      Side Planks SS3 - 3 rounds   x 30s  " "each side 3 rounds              TA Hamstring sliders SS4- 3 rounds -  10x double leg     Step Downs  - 3x12 each leg 6 inch box     PLYOMETRICS:  Hopping  Fwd and back hopping  Lateral hopping   Low box jumps   X  X  X  X   30s x 2   30s x 2  30s x 2  2x10 focus on landing strategies 6 inch box     Wall Ball Squats with pause - 3-5s hold 30x Green ball  20# Vest     Sled Push SS1 - #70 - push & pull 1 lap x3 rounds     Wall Sits  SS1 - 30" x3 rounds   PLAN             CPT Codes available for Billing:   (0) minutes of Manual therapy (MT) to improve pain and ROM.  (30) minutes of Therapeutic Exercise (TE) to develop strength, endurance, range of motion, and flexibility.  (15) minutes of Neuromuscular Re-Education (NMR)  to improve: Balance, Coordination, Kinesthetic, Sense, Proprioception, and Posture.  (15) minutes of Therapeutic Activities (TA) to improve functional performance.  (0) minutes of Gait Training (GT) to improve functional gait mechanics.  (0) minutes of Self- Care and Education  Unattended Electrical Stimulation (ES) for muscle performance or pain modulation.  Vasopneumatic Device Therapy () for management of swelling/edema. (93859)  BFR: Blood flow restriction applied during exercise  NP or (-): Not Performed    See EMR under MEDIA for written consent provided for Dry Needling- DATE   Palpation Assessment to determine the necessity for Functional Dry Needling     PATIENT EDUCATION AND HOME EXERCISES      Education/Self-Care provided:  (included in treatment unless specified above) minutes   Patient educated on the impairments noted above and the effects of physical therapy intervention to improve overall condition and Quality of Life  Patient was educated on all the above exercise prior/during/after for proper posture, positioning, and execution for safe performance with home exercise program.      Written Home Exercises Provided: yes. Prefers: [x] Printed [x] Electronic  Exercises were reviewed and " Rylee was able to demonstrate them prior to the end of the session.  Rylee demonstrated good understanding of the education provided. See EMR under Patient Instructions for exercises provided during therapy sessions.    ASSESSMENT     Patient with good tolerance to this session, we added in some low level hopping today to address tolerance to plyometrics and impact activities. She had good tolerance but needed cueing for soft landing and loading strategies.   Lisa Is progressing well towards her goals.   Pt prognosis is Good.     Pt will continue to benefit from skilled outpatient physical therapy to address the deficits listed in the problem list box on initial evaluation, provide pt/family education and to maximize pt's level of independence in the home and community environment.     Pt's spiritual, cultural and educational needs considered and pt agreeable to plan of care and goals.    Anticipated Barriers for therapy: adherence to treatment plan and coping style       Short Term Goals:  6 weeks Status  Date Met   PAIN: Pt will report worst pain of 6/10 in order to progress toward max functional ability and improve quality of life. [] Progressing  [x] Met  [] Not Met 9/26/2024    FUNCTION: Patient will demonstrate improved function as indicated by a score of greater than or equal to 80 out of 100 on FOTO. [] Progressing  [x] Met  [] Not Met 9/26/2024    MOBILITY: Patient will improve AROM to 50% of stated goals, listed in objective measures above, in order to progress towards independence with functional activities.  [] Progressing  [x] Met  [] Not Met 9/26/2024    STRENGTH: Patient will improve strength to 50% of stated goals, listed in objective measures above, in order to progress towards independence with functional activities. [] Progressing  [x] Met  [] Not Met 9/26/2024    POSTURE: Patient will correct postural deviations in sitting and standing, to decrease pain and promote long term stability.  []  Progressing  [x] Met  [] Not Met 9/26/2024    GAIT: Patient will demonstrate improved gait mechanics including improved step length and stance time in order to improve functional mobility, improve quality of life, and decrease risk of further injury or fall.  [] Progressing  [x] Met  [] Not Met 9/26/2024    HEP: Patient will demonstrate independence with HEP in order to progress toward functional independence. [] Progressing  [x] Met  [] Not Met 9/26/2024       Long Term Goals:  12 weeks Status Date Met   PAIN: Pt will report worst pain of 2/10 in order to progress toward max functional ability and improve quality of life [x] Progressing  [] Met  [] Not Met     FUNCTION: Patient will demonstrate improved function as indicated by a score of greater than or equal to 83 out of 100 on FOTO. [] Progressing  [x] Met  [] Not Met  9/26/2024   MOBILITY: Patient will improve AROM to stated goals, listed in objective measures above, in order to return to maximal functional potential and improve quality of life.  [x] Progressing  [] Met  [] Not Met     STRENGTH: Patient will improve strength to stated goals, listed in objective measures above, in order to improve functional independence and quality of life.  [x] Progressing  [] Met  [] Not Met     GAIT: Patient will demonstrate normalized gait mechanics with minimal compensation in order to return to PLOF. [] Progressing  [x] Met  [] Not Met  9/26/2024   Patient will return to normal ADL's, IADL's, community involvement, recreational activities, and work-related activities with less than or equal to 2/10 pain and maximal function.  [x] Progressing  [] Met  [] Not Met        Plan      Plan of care Certification: 8/26/2024 to 11/19/2024.    PLAN     Continue Plan of Care (POC) and progress per patient tolerance. See Treatment section for exercise progression.    Ruben Saleh, PT    Disclaimer: This note was prepared using a voice recognition system and is likely to have  sound alike errors within the text.

## 2024-10-11 ENCOUNTER — OFFICE VISIT (OUTPATIENT)
Dept: SPORTS MEDICINE | Facility: CLINIC | Age: 54
End: 2024-10-11
Payer: COMMERCIAL

## 2024-10-11 DIAGNOSIS — M17.11 OSTEOARTHRITIS OF RIGHT KNEE, UNSPECIFIED OSTEOARTHRITIS TYPE: Primary | ICD-10-CM

## 2024-10-11 DIAGNOSIS — M25.561 RIGHT KNEE PAIN, UNSPECIFIED CHRONICITY: ICD-10-CM

## 2024-10-11 DIAGNOSIS — M25.461 PAIN AND SWELLING OF RIGHT KNEE: ICD-10-CM

## 2024-10-11 DIAGNOSIS — M25.561 PAIN AND SWELLING OF RIGHT KNEE: ICD-10-CM

## 2024-10-11 PROCEDURE — 99999 PR PBB SHADOW E&M-EST. PATIENT-LVL III: CPT | Mod: PBBFAC,,, | Performed by: PHYSICIAN ASSISTANT

## 2024-10-12 NOTE — PROGRESS NOTES
Patient ID: Zainab Mcgraw  YOB: 1970  MRN: [id number]    Chief Complaint: Pain of the Right Knee    Referred By: [facility] Parent    History of Present Illness: Zainab Mcgraw is a  54 y.o. female [facility] ([facility]) Data Unavailable with a chief complaint of Pain of the Right Knee    Zainab Mcgraw is a 54 y.o. presents today for evaluation and management of recheck on right knee pain.   Patient states that since her injection on 08/23/2024 she has had improvement with her right knee pain.  She states that this injury was originally back in February.  She has continued to use a compressive knee brace.  She has been new oral and topical anti-inflammatories as needed.  At the last visit discussed possible Visco.  At this time denies any fevers, chills, night sweats, numbness and tingling.    Recall previous HPI on 8/19/24-Dr. Maximiliano Quispe:   Zainab Mcgraw is a 53 y.o. female presents to clinic today for evaluation of right knee pain.  She was self-referred for further evaluation management of the knee pain that has been going on for several months.  She originally notice injury back in February which she stepped off a ledge and hyperextending her knee.  She has placed her knee in a knee brace in had continued with doing at-home exercises.  She stated she notices swelling to go down and noticed that the knee pain got better.  She then re-injured her knee on 08/14 while playing pickleball without her brace.  She has noticed swelling and pain since the pain is worse with walking, moving or changing positions.  Rates the pain as a 3/10.  Denies any fevers, chills, night sweats, numbness and tingling     Past Medical History:   History reviewed. No pertinent past medical history.  History reviewed. No pertinent surgical history.  No family history on file.  Social History     Socioeconomic History    Marital status:    Tobacco Use    Smoking status: Never      Passive exposure: Past    Smokeless tobacco: Never   Social History Narrative    ** Merged History Encounter **          Social Drivers of Health     Financial Resource Strain: Low Risk  (8/15/2024)    Received from [facility]    Overall Financial Resource Strain (CARDIA)     Difficulty of Paying Living Expenses: Not hard at all   Food Insecurity: No Food Insecurity (8/15/2024)    Received from [facility]    Hunger Vital Sign     Worried About Running Out of Food in the Last Year: Never true     Ran Out of Food in the Last Year: Never true   Transportation Needs: No Transportation Needs (8/15/2024)    Received from [facility]    PRAPARE - Transportation     Lack of Transportation (Medical): No     Lack of Transportation (Non-Medical): No   Housing Stability: Unknown (8/15/2024)    Received from [facility]    Housing Stability Vital Sign     Unable to Pay for Housing in the Last Year: No     Homeless in the Last Year: No     Medication List with Changes/Refills   Current Medications    CHOLECALCIFEROL, VITD3,/VIT K2 (VITAMIN D3-VITAMIN K2 ORAL)    Take by mouth.    DICLOFENAC SODIUM (VOLTAREN) 1 % GEL    Apply 2 g topically 3 (three) times daily.    LACTOBACILLUS RHAMNOSUS GG (CULTURELLE) 10 BILLION CELL CAPSULE    Take 1 capsule by mouth.    NAPROXEN (NAPROSYN) 500 MG TABLET    TAKE 1 TABLET BY MOUTH TWICE A DAY    TURMERIC ORAL    Take 1 capsule by mouth every evening.     Review of patient's allergies indicates:  No Known Allergies  Review of Systems   Constitutional: Negative for chills and fever.   HENT:  Negative for sore throat.    Eyes:  Negative for pain.   Cardiovascular:  Negative for chest pain and leg swelling.   Respiratory:  Negative for cough and shortness of breath.    Skin:  Negative for itching and rash.   Musculoskeletal:  Positive for joint pain.   Gastrointestinal:  Negative for abdominal pain, nausea and vomiting.   Genitourinary:  Negative for dysuria.   Neurological:   Negative for dizziness, numbness and paresthesias.       Physical Exam:   There is no height or weight on file to calculate BMI.  There were no vitals filed for this visit.   GENERAL: Well appearing, appropriate for stated age, no acute distress.  CARDIOVASCULAR: Pulses regular by peripheral palpation.  PULMONARY: Respirations are even and non-labored.  NEURO: Awake, alert, and oriented x 3.  PSYCH: Mood & affect are appropriate.  HEENT: Head is normocephalic and atraumatic.  General    Nursing note and vitals reviewed.          Right Knee Exam   Right knee exam is normal.    Inspection   Effusion: absent    Tenderness   The patient is tender to palpation of the medial joint line.    Range of Motion   Extension:  0   Flexion:  130     Tests   Ligament Examination   Lachman: normal (-1 to 2mm)   PCL-Posterior Drawer: normal (0 to 2mm)     MCL - Valgus: normal (0 to 2mm)  LCL - Varus: normal    Other   Sensation: normal    Left Knee Exam   Left knee exam is normal.    Inspection   Effusion: absent    Tenderness   The patient is experiencing no tenderness.     Range of Motion   Extension:  0   Flexion:  130     Tests   Stability   Lachman: normal (-1 to 2mm)   PCL-Posterior Drawer: normal (0 to 2mm)  MCL - Valgus: normal (0 to 2mm)  LCL - Varus: normal (0 to 2mm)    Other   Sensation: normal    Muscle Strength   Right Lower Extremity   Hip Abduction: 5/5   Quadriceps:  5/5   Hamstrin/5   Left Lower Extremity   Hip Abduction: 5/5   Quadriceps:  5/5   Hamstrin/5     Vascular Exam     Right Pulses  Dorsalis Pedis:      2+  Posterior Tibial:      2+        Left Pulses  Dorsalis Pedis:      2+  Posterior Tibial:      2+        All compartments are soft and compressible. Calf soft non-tender. Intact EHL, FHL, gastroc soleus, and tibialis anterior. Sensation intact to light touch in superficial peroneal, deep peroneal, tibial, sural, and saphenous nerve distributions. Foot warm and well perfused with capillary  refill of less than 2 seconds and palpable pedal pulses.     Imaging:    X-ray Knee Ortho Right with Flexion  Narrative: EXAM: XR KNEE ORTHO RIGHT WITH FLEXION    CLINICAL HISTORY: Knee pain    FINDINGS:  No fracture or dislocation is identified.  A small joint effusion is visible on the lateral view.  Joint spaces are maintained at both knees.  Impression: 1.  No evidence of acute osseous injury.  2.  Small right knee joint effusion.    Finalized on: 8/19/2024 4:34 PM By:  Gabe Rodriguez  [facility]# [id number]      2024-08-19 16:[id number]    [facility]      Relevant imaging results reviewed and interpreted by me, discussed with the patient and / or family today.     Other Tests:     Patient Instructions   Assessment:  Zainab Mcgraw is a  54 y.o. female [facility] ([facility]) Data Unavailable with a chief complaint of Pain of the Right Knee  Presents today for recheck on right knee pain  Previous right knee steroid injection on 08/23/2024    Encounter Diagnoses   Name Primary?    Osteoarthritis of right knee, unspecified osteoarthritis type Yes    Pain and swelling of right knee     Right knee pain, unspecified chronicity     Body mass index (BMI) 25.0-25.9, adult       Plan:  Continue conservative treatment  Continue naproxen as needed  Continue knee brace as needed  Discussed Visco gel injections  Follow-up in 4 weeks for right knee injection    Follow-up: 4 weeks for visco or sooner if there are any problems between now and then.    Leave Review:   Google: Leave Google Review  Healthgrades: Leave Healthgrades Review    After Hours Number: [phone]      Provider Note/Medical Decision Making:   MEDICAL NECESSITY FOR VISCOSUPPLEMENTATION: After thorough evaluation of the patient, I have determined that visco-supplementation is medically necessary. The patient has painful DJD of the knee with failure of conservative therapy including lifestyle modifications and rehabilitation exercises. Oral  analgesis/NSAIDs have not adequately controlled symptoms and there is radiographic evidence of joint space narrowing, subchondral sclerosis, and some early osteophytic changes Kellgren- Opla grade 2 or greater, or in lack of radiographic evidence, there is arthroscopic or other evidence of chondrosis.    I discussed worrisome and red flag signs and symptoms with the patient. The patient expressed understanding and agreed to alert me immediately or to go to the emergency room if they experience any of these.   Treatment plan was developed with input from the patient/family, and they expressed understanding and agreement with the plan. All questions were answered today.      Tereza Rodríguez PA-C  Sports Medicine Physician Assistant       Disclaimer: This note was prepared using a voice recognition system and is likely to have sound alike errors within the text.

## 2024-10-12 NOTE — PATIENT INSTRUCTIONS
Assessment:  Zainab Mcgraw is a  54 y.o. female [facility] ([facility]) Data Unavailable with a chief complaint of Pain of the Right Knee  Presents today for recheck on right knee pain  Previous right knee steroid injection on 08/23/2024    Encounter Diagnoses   Name Primary?    Osteoarthritis of right knee, unspecified osteoarthritis type Yes    Pain and swelling of right knee     Right knee pain, unspecified chronicity     Body mass index (BMI) 25.0-25.9, adult       Plan:  Continue conservative treatment  Continue naproxen as needed  Continue knee brace as needed  Discussed Visco gel injections  Follow-up in 4 weeks for right knee injection    Follow-up: 4 weeks for visco or sooner if there are any problems between now and then.    Leave Review:   Google: Leave Google Review  Healthgrades: Leave Healthgrades Review    After Hours Number: [phone]

## 2024-10-14 ENCOUNTER — CLINICAL SUPPORT (OUTPATIENT)
Dept: REHABILITATION | Facility: HOSPITAL | Age: 54
End: 2024-10-14
Payer: COMMERCIAL

## 2024-10-14 DIAGNOSIS — M25.561 CHRONIC PAIN OF RIGHT KNEE: Primary | ICD-10-CM

## 2024-10-14 DIAGNOSIS — R29.898 WEAKNESS OF RIGHT LOWER EXTREMITY: ICD-10-CM

## 2024-10-14 DIAGNOSIS — M25.661 DECREASED RANGE OF MOTION OF RIGHT KNEE: ICD-10-CM

## 2024-10-14 DIAGNOSIS — G89.29 CHRONIC PAIN OF RIGHT KNEE: Primary | ICD-10-CM

## 2024-10-14 PROCEDURE — 97530 THERAPEUTIC ACTIVITIES: CPT

## 2024-10-14 PROCEDURE — 97110 THERAPEUTIC EXERCISES: CPT

## 2024-10-14 NOTE — PROGRESS NOTES
"[facility]   Physical Therapy Treatment Note + Progress Note        Name: Zainab Mcgraw  Clinic Number: [id number]    Therapy Diagnosis:   Encounter Diagnoses   Name Primary?    Chronic pain of right knee Yes    Weakness of right lower extremity     Decreased range of motion of right knee      Physician: Tereza Hernandez, *    Visit Date: 10/14/2024    Physician Orders: PT Eval and Treat  Medical Diagnosis from Referral: Right knee pain, unspecified chronicity [M25.561]   Evaluation Date: 8/26/2024  Authorization Period Expiration: 8/19/2025  Plan of Care Expiration: 11/19/2024  Progress Note Due:10/24/2024  Visit # / Visits authorized: 7/15   FOTO: 1/3 (last performed on 8/26/2024)     Precautions: Standard    PTA Visit #: 0/5     Time In: 10:00 AM  Time Out: 10:50 AM  Total Billable Time: 48 minutes    SUBJECTIVE     Pt reports: She going to get Gel injections as she has noticed the pain slightly coming back around a 1/10 but feels more confident with her knee.   Compliance with Hep: Daily  Response to previous treatment: no adverse reactions to treatment/updated HEP  Functional change: Better    Pain: 0/10   Worst: 8/10  Location: knee      OBJECTIVE     Objective Measures updated at progress report unless specified otherwise.       Treatment     Gym/Equipment Access: full  Time to Complete Exercises: increased for cueing and education    Lisa received the treatments listed below:      CPT Intervention Performed   Today Duration / Intensity   MT Patella MOBS -  5 minutes - long sit    Lumbar and Thoracic HVLAT - 5 minutes    TE Chondral Mobility:  x Elliptical 6 minutes      Prone Knee Flexion - 30" holds, x5 each side.     Dynamic Warm x Hugs, sweeps, grabs     Seated Knee Ext matrix - DL  4x8  #55     Seated Knee flexion Matrix-  - DL  4x8  #55     RDL with KB SS2- 3 rounds - #25 x15     Hip Thrust SS2- 3 rounds SS3 - #30 sand bag x15   NMR Lateral Walks SS4 - 3 rounds   -  " "1 lap 3 rounds      Side Planks SS3 - 3 rounds   - 30s  each side 3 rounds              TA Hamstring sliders SS4- 3 rounds -  10x double leg     Step Downs  - 3x12 each leg 6 inch box     PLYOMETRICS:  Hopping DL and SL   Fwd and back hopping  Lateral hopping   Shuttle Jumps DL  Shuttle Jumps SL  Box jumps   X  X  X  X  X  x   30s x 2   30s x 2  30s x 2  5 bands 20x 2 rounds  3 bands 20x each leg 2 rounds   6 inch box 20x focus on force absorption and equal landing    AGILITY:  Lateral Shuffles  Back Pedal  Forward Jogs  Jog with back pedal and shuffle   X  X  X  x   Full Turf 2 rounds   Full Turf 2 rounds   Full Turf 2 rounds   Full Turf 2 rounds     Wall Ball Squats with pause - 3-5s hold 30x Green ball  20# Vest     Sled Push SS1 - #70 - push & pull 1 lap x3 rounds     Wall Sits  SS1 - 30" x3 rounds   PLAN             CPT Codes available for Billing:   (0) minutes of Manual therapy (MT) to improve pain and ROM.  (8) minutes of Therapeutic Exercise (TE) to develop strength, endurance, range of motion, and flexibility.  (0) minutes of Neuromuscular Re-Education (NMR)  to improve: Balance, Coordination, Kinesthetic, Sense, Proprioception, and Posture.  (40) minutes of Therapeutic Activities (TA) to improve functional performance.  (0) minutes of Gait Training (GT) to improve functional gait mechanics.  (0) minutes of Self- Care and Education  Unattended Electrical Stimulation (ES) for muscle performance or pain modulation.  Vasopneumatic Device Therapy () for management of swelling/edema. (24034)  BFR: Blood flow restriction applied during exercise  NP or (-): Not Performed    See EMR under MEDIA for written consent provided for Dry Needling- DATE   Palpation Assessment to determine the necessity for Functional Dry Needling     PATIENT EDUCATION AND HOME EXERCISES      Education/Self-Care provided:  (included in treatment unless specified above) minutes   Patient educated on the impairments noted above and the " effects of physical therapy intervention to improve overall condition and Quality of Life  Patient was educated on all the above exercise prior/during/after for proper posture, positioning, and execution for safe performance with home exercise program.      Written Home Exercises Provided: yes. Prefers: [x] Printed [x] Electronic  Exercises were reviewed and Rylee was able to demonstrate them prior to the end of the session.  Rylee demonstrated good understanding of the education provided. See EMR under Patient Instructions for exercises provided during therapy sessions.    ASSESSMENT     Patient with good tolerance to this session, we focused on plyometric and low level agility work for return to activity training. Patient did well with introduction of DL hopping but struggles with force absorption patterns with single leg activity. All agility was performed at 50% capacity focusing on form and getting use to the movement patterns.   Lisa Is progressing well towards her goals.   Pt prognosis is Good.     Pt will continue to benefit from skilled outpatient physical therapy to address the deficits listed in the problem list box on initial evaluation, provide pt/family education and to maximize pt's level of independence in the home and community environment.     Pt's spiritual, cultural and educational needs considered and pt agreeable to plan of care and goals.    Anticipated Barriers for therapy: adherence to treatment plan and coping style       Short Term Goals:  6 weeks Status  Date Met   PAIN: Pt will report worst pain of 6/10 in order to progress toward max functional ability and improve quality of life. [] Progressing  [x] Met  [] Not Met 9/26/2024    FUNCTION: Patient will demonstrate improved function as indicated by a score of greater than or equal to 80 out of 100 on FOTO. [] Progressing  [x] Met  [] Not Met 9/26/2024    MOBILITY: Patient will improve AROM to 50% of stated goals, listed in objective  measures above, in order to progress towards independence with functional activities.  [] Progressing  [x] Met  [] Not Met 9/26/2024    STRENGTH: Patient will improve strength to 50% of stated goals, listed in objective measures above, in order to progress towards independence with functional activities. [] Progressing  [x] Met  [] Not Met 9/26/2024    POSTURE: Patient will correct postural deviations in sitting and standing, to decrease pain and promote long term stability.  [] Progressing  [x] Met  [] Not Met 9/26/2024    GAIT: Patient will demonstrate improved gait mechanics including improved step length and stance time in order to improve functional mobility, improve quality of life, and decrease risk of further injury or fall.  [] Progressing  [x] Met  [] Not Met 9/26/2024    HEP: Patient will demonstrate independence with HEP in order to progress toward functional independence. [] Progressing  [x] Met  [] Not Met 9/26/2024       Long Term Goals:  12 weeks Status Date Met   PAIN: Pt will report worst pain of 2/10 in order to progress toward max functional ability and improve quality of life [x] Progressing  [] Met  [] Not Met     FUNCTION: Patient will demonstrate improved function as indicated by a score of greater than or equal to 83 out of 100 on FOTO. [] Progressing  [x] Met  [] Not Met  9/26/2024   MOBILITY: Patient will improve AROM to stated goals, listed in objective measures above, in order to return to maximal functional potential and improve quality of life.  [x] Progressing  [] Met  [] Not Met     STRENGTH: Patient will improve strength to stated goals, listed in objective measures above, in order to improve functional independence and quality of life.  [x] Progressing  [] Met  [] Not Met     GAIT: Patient will demonstrate normalized gait mechanics with minimal compensation in order to return to PLOF. [] Progressing  [x] Met  [] Not Met  9/26/2024   Patient will return to normal ADL's, IADL's,  community involvement, recreational activities, and work-related activities with less than or equal to 2/10 pain and maximal function.  [x] Progressing  [] Met  [] Not Met         PLAN     Plan of care Certification: 8/26/2024 to 11/19/2024.    Continue Plan of Care (POC) and progress per patient tolerance. See Treatment section for exercise progression.    Ruben Saleh, PT    Disclaimer: This note was prepared using a voice recognition system and is likely to have sound alike errors within the text.

## 2024-10-17 ENCOUNTER — CLINICAL SUPPORT (OUTPATIENT)
Dept: REHABILITATION | Facility: HOSPITAL | Age: 54
End: 2024-10-17
Payer: COMMERCIAL

## 2024-10-17 DIAGNOSIS — M25.661 DECREASED RANGE OF MOTION OF RIGHT KNEE: ICD-10-CM

## 2024-10-17 DIAGNOSIS — G89.29 CHRONIC PAIN OF RIGHT KNEE: Primary | ICD-10-CM

## 2024-10-17 DIAGNOSIS — M25.561 CHRONIC PAIN OF RIGHT KNEE: Primary | ICD-10-CM

## 2024-10-17 DIAGNOSIS — R29.898 WEAKNESS OF RIGHT LOWER EXTREMITY: ICD-10-CM

## 2024-10-17 PROCEDURE — 97112 NEUROMUSCULAR REEDUCATION: CPT | Performed by: PHYSICAL THERAPIST

## 2024-10-17 PROCEDURE — 97530 THERAPEUTIC ACTIVITIES: CPT | Performed by: PHYSICAL THERAPIST

## 2024-10-17 PROCEDURE — 97110 THERAPEUTIC EXERCISES: CPT | Performed by: PHYSICAL THERAPIST

## 2024-10-17 NOTE — PROGRESS NOTES
"[facility]   Physical Therapy Treatment Note        Name: Zainab Mcgraw  Clinic Number: [id number]    Therapy Diagnosis:   Encounter Diagnoses   Name Primary?    Chronic pain of right knee Yes    Weakness of right lower extremity     Decreased range of motion of right knee      Physician: Tereza Hernandez, *    Visit Date: 10/17/2024    Physician Orders: PT Eval and Treat  Medical Diagnosis from Referral: Right knee pain, unspecified chronicity [M25.561]   Evaluation Date: 8/26/2024  Authorization Period Expiration: 8/19/2025  Plan of Care Expiration: 11/19/2024  Progress Note Due:10/24/2024  Visit # / Visits authorized: 12/15   FOTO: 2/3 (last performed on 9/26/2024)     Precautions: Standard    PTA Visit #: 0/5     Time In: 1005  Time Out: 1115  Total Billable Time: 55 minutes    SUBJECTIVE     Pt reports: Felt good after last time, was a little sore, low level of pain - if she had to rate it would be a 1.  She is bummed it is coming back, but thinks that may be due to doing more in general.  Compliance with Hep: Daily  Response to previous treatment: no adverse reactions to treatment/updated HEP  Functional change: Better    Pain: 0/10   Worst: 8/10  Location: knee      OBJECTIVE     Objective Measures updated at progress report unless specified otherwise.       Treatment     Gym/Equipment Access: full  Time to Complete Exercises: increased for cueing and education    Lisa received the treatments listed below:      CPT Intervention Performed   Today Duration / Intensity   MT Patella MOBS -  5 minutes - long sit     Lumbar and Thoracic HVLAT - 5 minutes    TE Chondral Mobility:  x Elliptical 6 minutes      Child pose- quad stretch x 30" holds, x5      Dynamic Warm x Hugs, sweeps, grabs, scorpions     Seated Knee Ext matrix   DL  4x8  #55     Seated Knee flexion Matrix-    DL  4x8  #55     RDL with KB    #25 x15     Hip Thrust    #30 sand bag x15   NMR Lateral Walks (3 rounds) " "SS3  1 lap 3 rounds      Side Planks (3 rounds) SS3 30s each side, with clam shell   TA Nordic Hamstring + band (3 rounds) SS2 10x double leg - Green band assist     Step Downs  (3 rounds) SS2 3x12 each leg 6 inch box      Sled Push (3 rounds) SS1 #105 - push & pull 1 lap x3 rounds     Wall Sits    (3 rounds) SS1 60" x3 rounds     PLYOMETRICS:  Hopping DL and SL  Fwd and back hopping  Lateral hopping  Shuttle Jumps DL  Shuttle Jumps SL  Box jumps      30s x 2   30s x 2  30s x 2  5 bands 20x 2 rounds  3 bands 20x each leg 2 rounds   6 inch box 20x focus on force absorption and equal landing     AGILITY:  Lateral Shuffles  Back Pedal  Forward Jogs  Jog with back pedal and shuffle      Full Turf 2 rounds   Full Turf 2 rounds   Full Turf 2 rounds   Full Turf 2 rounds      Wall Ball Squats with pause - 3-5s hold 30x Green ball 20# Vest   PLAN             CPT Codes available for Billing:   (0) minutes of Manual therapy (MT) to improve pain and ROM.  (10) minutes of Therapeutic Exercise (TE) to develop strength, endurance, range of motion, and flexibility.  (15) minutes of Neuromuscular Re-Education (NMR)  to improve: Balance, Coordination, Kinesthetic, Sense, Proprioception, and Posture.  (30) minutes of Therapeutic Activities (TA) to improve functional performance.  (0) minutes of Gait Training (GT) to improve functional gait mechanics.  (0) minutes of Self- Care and Education  Unattended Electrical Stimulation (ES) for muscle performance or pain modulation.  Vasopneumatic Device Therapy () for management of swelling/edema. (25735)  BFR: Blood flow restriction applied during exercise  NP or (-): Not Performed    See EMR under MEDIA for written consent provided for Dry Needling- DATE   Palpation Assessment to determine the necessity for Functional Dry Needling     PATIENT EDUCATION AND HOME EXERCISES      Education/Self-Care provided:  (included in treatment unless specified above) minutes   Patient educated on the " impairments noted above and the effects of physical therapy intervention to improve overall condition and Quality of Life  Patient was educated on all the above exercise prior/during/after for proper posture, positioning, and execution for safe performance with home exercise program.      Written Home Exercises Provided: yes. Prefers: [x] Printed [x] Electronic  Exercises were reviewed and Rylee was able to demonstrate them prior to the end of the session.  Rylee demonstrated good understanding of the education provided. See EMR under Patient Instructions for exercises provided during therapy sessions.    ASSESSMENT     Lisa Mcgraw tolerated Physical Therapy  session well with minimal  complaints of pain or discomfort.  Objective findings are improving with pain, range of motion, strength, endurance, exercise tolerance, and functional mobility.  Lisa Mcgraw continues to have difficulty with heavy loading, but it is a challenge for her that does not increase pain just exertion.  Zainab demonstrated good understanding of new exercises and will continue to progress at home until next follow-up.       Lisa Is progressing well towards her goals.   Pt prognosis is Good.     Pt will continue to benefit from skilled outpatient physical therapy to address the deficits listed in the problem list box on initial evaluation, provide pt/family education and to maximize pt's level of independence in the home and community environment.     Pt's spiritual, cultural and educational needs considered and pt agreeable to plan of care and goals.    Anticipated Barriers for therapy: adherence to treatment plan and coping style       Short Term Goals:  6 weeks Status  Date Met   PAIN: Pt will report worst pain of 6/10 in order to progress toward max functional ability and improve quality of life. [] Progressing  [x] Met  [] Not Met 9/26/2024    FUNCTION: Patient will demonstrate improved function as indicated by a score of greater  than or equal to 80 out of 100 on FOTO. [] Progressing  [x] Met  [] Not Met 9/26/2024    MOBILITY: Patient will improve AROM to 50% of stated goals, listed in objective measures above, in order to progress towards independence with functional activities.  [] Progressing  [x] Met  [] Not Met 9/26/2024    STRENGTH: Patient will improve strength to 50% of stated goals, listed in objective measures above, in order to progress towards independence with functional activities. [] Progressing  [x] Met  [] Not Met 9/26/2024    POSTURE: Patient will correct postural deviations in sitting and standing, to decrease pain and promote long term stability.  [] Progressing  [x] Met  [] Not Met 9/26/2024    GAIT: Patient will demonstrate improved gait mechanics including improved step length and stance time in order to improve functional mobility, improve quality of life, and decrease risk of further injury or fall.  [] Progressing  [x] Met  [] Not Met 9/26/2024    HEP: Patient will demonstrate independence with HEP in order to progress toward functional independence. [] Progressing  [x] Met  [] Not Met 9/26/2024       Long Term Goals:  12 weeks Status Date Met   PAIN: Pt will report worst pain of 2/10 in order to progress toward max functional ability and improve quality of life [x] Progressing  [] Met  [] Not Met     FUNCTION: Patient will demonstrate improved function as indicated by a score of greater than or equal to 83 out of 100 on FOTO. [] Progressing  [x] Met  [] Not Met  9/26/2024   MOBILITY: Patient will improve AROM to stated goals, listed in objective measures above, in order to return to maximal functional potential and improve quality of life.  [x] Progressing  [] Met  [] Not Met     STRENGTH: Patient will improve strength to stated goals, listed in objective measures above, in order to improve functional independence and quality of life.  [x] Progressing  [] Met  [] Not Met     GAIT: Patient will demonstrate  normalized gait mechanics with minimal compensation in order to return to PLOF. [] Progressing  [x] Met  [] Not Met  9/26/2024   Patient will return to normal ADL's, IADL's, community involvement, recreational activities, and work-related activities with less than or equal to 2/10 pain and maximal function.  [x] Progressing  [] Met  [] Not Met         PLAN     Plan of care Certification: 8/26/2024 to 11/19/2024.    Continue Plan of Care (POC) and progress per patient tolerance. See Treatment section for exercise progression.    Columba Daniel, PT    Disclaimer: This note was prepared using a voice recognition system and is likely to have sound alike errors within the text.

## 2024-10-23 ENCOUNTER — CLINICAL SUPPORT (OUTPATIENT)
Dept: REHABILITATION | Facility: HOSPITAL | Age: 54
End: 2024-10-23
Payer: COMMERCIAL

## 2024-10-23 DIAGNOSIS — R29.898 WEAKNESS OF RIGHT LOWER EXTREMITY: ICD-10-CM

## 2024-10-23 DIAGNOSIS — G89.29 CHRONIC PAIN OF RIGHT KNEE: Primary | ICD-10-CM

## 2024-10-23 DIAGNOSIS — M25.661 DECREASED RANGE OF MOTION OF RIGHT KNEE: ICD-10-CM

## 2024-10-23 DIAGNOSIS — M25.561 CHRONIC PAIN OF RIGHT KNEE: Primary | ICD-10-CM

## 2024-10-23 PROCEDURE — 97110 THERAPEUTIC EXERCISES: CPT | Performed by: PHYSICAL THERAPIST

## 2024-10-23 PROCEDURE — 97530 THERAPEUTIC ACTIVITIES: CPT | Performed by: PHYSICAL THERAPIST

## 2024-10-23 PROCEDURE — 97112 NEUROMUSCULAR REEDUCATION: CPT | Performed by: PHYSICAL THERAPIST

## 2024-10-24 NOTE — PROGRESS NOTES
"[facility]   Physical Therapy Treatment Note        Name: Zainab Mcgraw  Clinic Number: [id number]    Therapy Diagnosis:   Encounter Diagnoses   Name Primary?    Chronic pain of right knee Yes    Weakness of right lower extremity     Decreased range of motion of right knee      Physician: Tereza Hernandez, *    Visit Date: 10/23/2024    Physician Orders: PT Eval and Treat  Medical Diagnosis from Referral: Right knee pain, unspecified chronicity [M25.561]   Evaluation Date: 8/26/2024  Authorization Period Expiration: 8/19/2025  Plan of Care Expiration: 11/19/2024  Progress Note Due:10/24/2024  Visit # / Visits authorized: 12/15   FOTO: 2/3 (last performed on 9/26/2024)     Precautions: Standard    PTA Visit #: 0/5     Time In: 1500  Time Out: 1615  Total Billable Time: 60 minutes    SUBJECTIVE     Pt reports: 2/10 pain today, states shot is wearing off but pain is not getting worse, felt fine after last session.   Compliance with Hep: Daily  Response to previous treatment: no adverse reactions to treatment/updated HEP  Functional change: Better    Pain: 0/10   Worst: 8/10  Location: knee      OBJECTIVE     Objective Measures updated at progress report unless specified otherwise.       Treatment     Gym/Equipment Access: full  Time to Complete Exercises: increased for cueing and education    Lisa received the treatments listed below:      CPT Intervention Performed   Today Duration / Intensity   MT Patella MOBS -  5 minutes - long sit     Lumbar and Thoracic HVLAT - 5 minutes    TE Chondral Mobility:  x Elliptical 6 minutes      Child pose- quad stretch x 30" holds, x5      Dynamic Warm x Hugs, sweeps, grabs, scorpions     Seated Knee Ext matrix   DL  4x8  #55     Seated Knee flexion Matrix-    DL  4x8  #55     RDL with KB    #25 x15     Hip Thrust    #30 sand bag x15   NMR Lateral Walks (3 rounds) SS3 1 lap 3 rounds      Side Planks (3 rounds) SS3 30s each side, with clam shell " "  TA Monserrate Hamstring + band (3 rounds) SS2 10x double leg - Green band assist     Step Downs  (3 rounds) SS2 3x12 each leg 6 inch box      Sled Push (3 rounds) SS1 #105 - push & pull 1 lap x3 rounds     Wall Sits    (3 rounds) SS1 60" x3 rounds     PLYOMETRICS:  Hopping DL and SL  Fwd and back hopping  Lateral hopping  Shuttle Jumps DL  Shuttle Jumps SL  Box jumps      30s x 2   30s x 2  30s x 2  5 bands 20x 2 rounds  3 bands 20x each leg 2 rounds   6 inch box 20x focus on force absorption and equal landing     AGILITY:  Lateral Shuffles  Back Pedal  Forward Jogs  Jog with back pedal and shuffle      Full Turf 2 rounds   Full Turf 2 rounds   Full Turf 2 rounds   Full Turf 2 rounds      Wall Ball Squats with pause - 3-5s hold 30x Green ball 20# Vest   PLAN             CPT Codes available for Billing:   (0) minutes of Manual therapy (MT) to improve pain and ROM.  (15) minutes of Therapeutic Exercise (TE) to develop strength, endurance, range of motion, and flexibility.  (15) minutes of Neuromuscular Re-Education (NMR)  to improve: Balance, Coordination, Kinesthetic, Sense, Proprioception, and Posture.  (30) minutes of Therapeutic Activities (TA) to improve functional performance.  (0) minutes of Gait Training (GT) to improve functional gait mechanics.  (0) minutes of Self- Care and Education  Unattended Electrical Stimulation (ES) for muscle performance or pain modulation.  Vasopneumatic Device Therapy () for management of swelling/edema. (34500)  BFR: Blood flow restriction applied during exercise  NP or (-): Not Performed    See EMR under MEDIA for written consent provided for Dry Needling- DATE   Palpation Assessment to determine the necessity for Functional Dry Needling     PATIENT EDUCATION AND HOME EXERCISES      Education/Self-Care provided:  (included in treatment unless specified above) minutes   Patient educated on the impairments noted above and the effects of physical therapy intervention to improve " overall condition and Quality of Life  Patient was educated on all the above exercise prior/during/after for proper posture, positioning, and execution for safe performance with home exercise program.      Written Home Exercises Provided: yes. Prefers: [x] Printed [x] Electronic  Exercises were reviewed and Rylee was able to demonstrate them prior to the end of the session.  Rylee demonstrated good understanding of the education provided. See EMR under Patient Instructions for exercises provided during therapy sessions.    ASSESSMENT     Lisa Mcgraw tolerated Physical Therapy  session well with minimal  complaints of pain or discomfort.  Objective findings are improving with pain, range of motion, strength, endurance, exercise tolerance, and functional mobility.  Lisa Mcgraw continues to have difficulty with nordic hamstrings due to upper extremity requirements for this particular exercise.  Zainab demonstrated good understanding of new exercises and will continue to progress at home until next follow-up.       Lisa Is progressing well towards her goals.   Pt prognosis is Good.     Pt will continue to benefit from skilled outpatient physical therapy to address the deficits listed in the problem list box on initial evaluation, provide pt/family education and to maximize pt's level of independence in the home and community environment.     Pt's spiritual, cultural and educational needs considered and pt agreeable to plan of care and goals.    Anticipated Barriers for therapy: adherence to treatment plan and coping style       Short Term Goals:  6 weeks Status  Date Met   PAIN: Pt will report worst pain of 6/10 in order to progress toward max functional ability and improve quality of life. [] Progressing  [x] Met  [] Not Met 9/26/2024    FUNCTION: Patient will demonstrate improved function as indicated by a score of greater than or equal to 80 out of 100 on FOTO. [] Progressing  [x] Met  [] Not Met 9/26/2024     MOBILITY: Patient will improve AROM to 50% of stated goals, listed in objective measures above, in order to progress towards independence with functional activities.  [] Progressing  [x] Met  [] Not Met 9/26/2024    STRENGTH: Patient will improve strength to 50% of stated goals, listed in objective measures above, in order to progress towards independence with functional activities. [] Progressing  [x] Met  [] Not Met 9/26/2024    POSTURE: Patient will correct postural deviations in sitting and standing, to decrease pain and promote long term stability.  [] Progressing  [x] Met  [] Not Met 9/26/2024    GAIT: Patient will demonstrate improved gait mechanics including improved step length and stance time in order to improve functional mobility, improve quality of life, and decrease risk of further injury or fall.  [] Progressing  [x] Met  [] Not Met 9/26/2024    HEP: Patient will demonstrate independence with HEP in order to progress toward functional independence. [] Progressing  [x] Met  [] Not Met 9/26/2024       Long Term Goals:  12 weeks Status Date Met   PAIN: Pt will report worst pain of 2/10 in order to progress toward max functional ability and improve quality of life [x] Progressing  [] Met  [] Not Met     FUNCTION: Patient will demonstrate improved function as indicated by a score of greater than or equal to 83 out of 100 on FOTO. [] Progressing  [x] Met  [] Not Met  9/26/2024   MOBILITY: Patient will improve AROM to stated goals, listed in objective measures above, in order to return to maximal functional potential and improve quality of life.  [x] Progressing  [] Met  [] Not Met     STRENGTH: Patient will improve strength to stated goals, listed in objective measures above, in order to improve functional independence and quality of life.  [x] Progressing  [] Met  [] Not Met     GAIT: Patient will demonstrate normalized gait mechanics with minimal compensation in order to return to PLOF. []  Progressing  [x] Met  [] Not Met  9/26/2024   Patient will return to normal ADL's, IADL's, community involvement, recreational activities, and work-related activities with less than or equal to 2/10 pain and maximal function.  [x] Progressing  [] Met  [] Not Met         PLAN     Plan of care Certification: 8/26/2024 to 11/19/2024.    Continue Plan of Care (POC) and progress per patient tolerance. See Treatment section for exercise progression.    Columba Daniel, PT    Disclaimer: This note was prepared using a voice recognition system and is likely to have sound alike errors within the text.

## 2024-10-28 ENCOUNTER — CLINICAL SUPPORT (OUTPATIENT)
Dept: REHABILITATION | Facility: HOSPITAL | Age: 54
End: 2024-10-28
Payer: COMMERCIAL

## 2024-10-28 DIAGNOSIS — G89.29 CHRONIC PAIN OF RIGHT KNEE: Primary | ICD-10-CM

## 2024-10-28 DIAGNOSIS — R29.898 WEAKNESS OF RIGHT LOWER EXTREMITY: ICD-10-CM

## 2024-10-28 DIAGNOSIS — M25.661 DECREASED RANGE OF MOTION OF RIGHT KNEE: ICD-10-CM

## 2024-10-28 DIAGNOSIS — M25.561 CHRONIC PAIN OF RIGHT KNEE: Primary | ICD-10-CM

## 2024-10-28 PROCEDURE — 97530 THERAPEUTIC ACTIVITIES: CPT | Performed by: PHYSICAL THERAPIST

## 2024-10-28 PROCEDURE — 97112 NEUROMUSCULAR REEDUCATION: CPT | Performed by: PHYSICAL THERAPIST

## 2024-10-28 PROCEDURE — 97110 THERAPEUTIC EXERCISES: CPT | Performed by: PHYSICAL THERAPIST

## 2024-10-30 ENCOUNTER — CLINICAL SUPPORT (OUTPATIENT)
Dept: REHABILITATION | Facility: HOSPITAL | Age: 54
End: 2024-10-30
Payer: COMMERCIAL

## 2024-10-30 DIAGNOSIS — M25.561 CHRONIC PAIN OF RIGHT KNEE: Primary | ICD-10-CM

## 2024-10-30 DIAGNOSIS — G89.29 CHRONIC PAIN OF RIGHT KNEE: Primary | ICD-10-CM

## 2024-10-30 DIAGNOSIS — R29.898 WEAKNESS OF RIGHT LOWER EXTREMITY: ICD-10-CM

## 2024-10-30 DIAGNOSIS — M25.661 DECREASED RANGE OF MOTION OF RIGHT KNEE: ICD-10-CM

## 2024-10-30 PROCEDURE — 97530 THERAPEUTIC ACTIVITIES: CPT | Performed by: PHYSICAL THERAPIST

## 2024-10-30 PROCEDURE — 97110 THERAPEUTIC EXERCISES: CPT | Performed by: PHYSICAL THERAPIST

## 2024-10-30 PROCEDURE — 97112 NEUROMUSCULAR REEDUCATION: CPT | Performed by: PHYSICAL THERAPIST

## 2024-11-07 ENCOUNTER — CLINICAL SUPPORT (OUTPATIENT)
Dept: REHABILITATION | Facility: HOSPITAL | Age: 54
End: 2024-11-07
Payer: COMMERCIAL

## 2024-11-07 ENCOUNTER — PROCEDURE VISIT (OUTPATIENT)
Dept: SPORTS MEDICINE | Facility: CLINIC | Age: 54
End: 2024-11-07
Payer: COMMERCIAL

## 2024-11-07 VITALS — HEIGHT: 65 IN | WEIGHT: 155 LBS | BODY MASS INDEX: 25.83 KG/M2

## 2024-11-07 DIAGNOSIS — R29.898 WEAKNESS OF RIGHT LOWER EXTREMITY: ICD-10-CM

## 2024-11-07 DIAGNOSIS — M25.561 CHRONIC PAIN OF RIGHT KNEE: Primary | ICD-10-CM

## 2024-11-07 DIAGNOSIS — G89.29 CHRONIC PAIN OF RIGHT KNEE: Primary | ICD-10-CM

## 2024-11-07 DIAGNOSIS — M17.11 OSTEOARTHRITIS OF RIGHT KNEE, UNSPECIFIED OSTEOARTHRITIS TYPE: Primary | ICD-10-CM

## 2024-11-07 DIAGNOSIS — M25.661 DECREASED RANGE OF MOTION OF RIGHT KNEE: ICD-10-CM

## 2024-11-07 PROCEDURE — 97530 THERAPEUTIC ACTIVITIES: CPT | Performed by: PHYSICAL THERAPIST

## 2024-11-07 PROCEDURE — 97112 NEUROMUSCULAR REEDUCATION: CPT | Performed by: PHYSICAL THERAPIST

## 2024-11-07 PROCEDURE — 97110 THERAPEUTIC EXERCISES: CPT | Performed by: PHYSICAL THERAPIST

## 2024-11-07 NOTE — PROCEDURES
Large Joint Aspiration/Injection: R knee    Date/Time: 11/7/2024 12:00 PM    Performed by: Tereza Hernandez PA-C  Authorized by: Tereza Hernandez PA-C    Consent Done?:  Yes (Verbal)  Indications:  Joint swelling and pain  Site marked: the procedure site was marked    Timeout: prior to procedure the correct patient, procedure, and site was verified    Prep: patient was prepped and draped in usual sterile fashion      Local anesthesia used?: Yes    Local anesthetic:  Topical anesthetic    Details:  Needle Size:  22 G  Ultrasonic Guidance for needle placement?: No    Approach:  Superior  Location:  Knee  Site:  R knee  Medications:  20 mg sodium hyaluronate (EUFLEXXA) 10 mg/mL(mw 2.4 -3.6 million)  Patient tolerance:  Patient tolerated the procedure well with no immediate complications     1/3

## 2024-11-07 NOTE — PROGRESS NOTES
"[facility]   Physical Therapy Treatment Note    Name: Zainab Mcgraw  Clinic Number: [id number]    Therapy Diagnosis:   Encounter Diagnoses   Name Primary?    Chronic pain of right knee Yes    Weakness of right lower extremity     Decreased range of motion of right knee      Physician: Tereza Hernandez, *    Visit Date: 11/7/2024    Physician Orders: PT Eval and Treat  Medical Diagnosis from Referral: Right knee pain, unspecified chronicity [M25.561]   Evaluation Date: 8/26/2024  Authorization Period Expiration: 8/19/2025  Plan of Care Expiration: 11/19/2024  Progress Note Due:11/19/2024  Visit # / Visits authorized: 16 / 30   FOTO: 2 /3 (last performed on 9/26/2024)     Precautions: Standard  PTA Visit #: 0/5     Time In: 1000  Time Out: 1115  Total Billable Time: 60 minutes    SUBJECTIVE     Pt reports: Feel good today, no increased pain, but she does get a injection today.  Compliance with Hep: Daily  Response to previous treatment: no adverse reactions to treatment/updated HEP  Functional change: Better    Pain: 0/10   Worst: 8/10  Location: knee    OBJECTIVE     Objective Measures updated at progress report unless specified otherwise.     Treatment     Gym/Equipment Access: full  Time to Complete Exercises: increased for cueing and education    Lisa received the treatments listed below:      CPT Intervention Performed   Today Duration / Intensity   MT Patella MOBS -  5 minutes - long sit     Lumbar and Thoracic HVLAT - 5 minutes    TE Chondral Mobility:  x Elliptical 6 minutes      Child pose- quad stretch x 30" holds, x5      Dynamic Warm x Hugs, sweeps, grabs, scorpions     Seated Knee Ext matrix   DL  4x8  #55     Seated Knee flexion Matrix-    DL  4x8  #55     RDL with KB    #25 x15     Hip Thrust    #30 sand bag x15     Leg Press x SL #95 4x8 ea     Knee Extension x SL #35 4x8 ea     Hamstring Curl x DL #35 4x8    NMR Lateral Walks (3 rounds)   1 lap 3 rounds - pink band " "(knees)     Diagonal Walks (2 rounds) SS1 1 lap 3 rounds - pink band (knees)     Side Planks (3 rounds)   30s each side, with clam shell     Hamstring Sliders (2 rounds) SS1 X12 ea side   TA Nordic Hamstring + band (3 rounds)   10x double leg - Green band assist     Step Downs  (2 rounds) SS1 3x12 each leg 6 inch box #15 kettlebell      Sled Push (2 rounds) SS2 #105 - push & pull 1 lap x3 rounds     Wall Sits    (2 rounds) SS2 60" x3 rounds     Inverted Rows (2 rounds) SS2 X8 reps, squat rack bar     PLYOMETRICS:  Hopping DL and SL  Fwd and back hopping  Lateral hopping  Shuttle Jumps DL  Shuttle Jumps SL  Box jumps      30s x 2   30s x 2  30s x 2  5 bands 20x 2 rounds  3 bands 20x each leg 2 rounds   6 inch box 20x focus on force absorption and equal landing     AGILITY:  Lateral Shuffles  Back Pedal  Forward Jogs  Jog with back pedal and shuffle      Full Turf 2 rounds   Full Turf 2 rounds   Full Turf 2 rounds   Full Turf 2 rounds      Wall Ball Squats with pause - 3-5s hold 30x Green ball 20# Vest   PLAN             CPT Codes available for Billing:   (-) minutes of Manual therapy (MT) to improve pain and ROM.  (30) minutes of Therapeutic Exercise (TE) to develop strength, endurance, range of motion, and flexibility.  (15) minutes of Neuromuscular Re-Education (NMR)  to improve: Balance, Coordination, Kinesthetic, Sense, Proprioception, and Posture.  (15) minutes of Therapeutic Activities (TA) to improve functional performance.  (-) minutes of Gait Training (GT) to improve functional gait mechanics.  (-) minutes of Self- Care and Education  Unattended Electrical Stimulation (ES) for muscle performance or pain modulation.  Vasopneumatic Device Therapy () for management of swelling/edema. (96568)  BFR: Blood flow restriction applied during exercise  NP or (-): Not Performed    See EMR under MEDIA for written consent provided for Dry Needling- DATE   Palpation Assessment to determine the necessity for Functional " Dry Needling     PATIENT EDUCATION AND HOME EXERCISES      Education/Self-Care provided:  (included in treatment unless specified above) minutes   Patient educated on the impairments noted above and the effects of physical therapy intervention to improve overall condition and Quality of Life  Patient was educated on all the above exercise prior/during/after for proper posture, positioning, and execution for safe performance with home exercise program.      Written Home Exercises Provided: yes. Prefers: [x] Printed [x] Electronic  Exercises were reviewed and Rylee was able to demonstrate them prior to the end of the session.  Rylee demonstrated good understanding of the education provided. See EMR under Patient Instructions for exercises provided during therapy sessions.    ASSESSMENT     Lsia Mcgraw tolerated Physical Therapy  session well with no  complaints of pain or discomfort.  Objective findings are improving with pain, range of motion, strength, endurance, exercise tolerance, and functional mobility.  Lisa Mcgraw continues to have difficulty with heavy loads through the PFP joint and anterior chain.  Modifications have been made over the last few sessions - increasing her exercise tolerance and decreasing her symptoms.  Zainab demonstrated good understanding of new exercises and will continue to progress at home until next follow-up.     Lisa Is progressing well towards her goals.   Pt prognosis is Good.     Pt will continue to benefit from skilled outpatient physical therapy to address the deficits listed in the problem list box on initial evaluation, provide pt/family education and to maximize pt's level of independence in the home and community environment.     Pt's spiritual, cultural and educational needs considered and pt agreeable to plan of care and goals.    Anticipated Barriers for therapy: adherence to treatment plan and coping style       Short Term Goals:  6 weeks Status  Date Met   PAIN:  Pt will report worst pain of 6/10 in order to progress toward max functional ability and improve quality of life. [] Progressing  [x] Met  [] Not Met 9/26/2024    FUNCTION: Patient will demonstrate improved function as indicated by a score of greater than or equal to 80 out of 100 on FOTO. [] Progressing  [x] Met  [] Not Met 9/26/2024    MOBILITY: Patient will improve AROM to 50% of stated goals, listed in objective measures above, in order to progress towards independence with functional activities.  [] Progressing  [x] Met  [] Not Met 9/26/2024    STRENGTH: Patient will improve strength to 50% of stated goals, listed in objective measures above, in order to progress towards independence with functional activities. [] Progressing  [x] Met  [] Not Met 9/26/2024    POSTURE: Patient will correct postural deviations in sitting and standing, to decrease pain and promote long term stability.  [] Progressing  [x] Met  [] Not Met 9/26/2024    GAIT: Patient will demonstrate improved gait mechanics including improved step length and stance time in order to improve functional mobility, improve quality of life, and decrease risk of further injury or fall.  [] Progressing  [x] Met  [] Not Met 9/26/2024    HEP: Patient will demonstrate independence with HEP in order to progress toward functional independence. [] Progressing  [x] Met  [] Not Met 9/26/2024       Long Term Goals:  12 weeks Status Date Met   PAIN: Pt will report worst pain of 2/10 in order to progress toward max functional ability and improve quality of life [x] Progressing  [] Met  [] Not Met     FUNCTION: Patient will demonstrate improved function as indicated by a score of greater than or equal to 83 out of 100 on FOTO. [] Progressing  [x] Met  [] Not Met  9/26/2024   MOBILITY: Patient will improve AROM to stated goals, listed in objective measures above, in order to return to maximal functional potential and improve quality of life.  [x] Progressing  [] Met  []  Not Met     STRENGTH: Patient will improve strength to stated goals, listed in objective measures above, in order to improve functional independence and quality of life.  [x] Progressing  [] Met  [] Not Met     GAIT: Patient will demonstrate normalized gait mechanics with minimal compensation in order to return to PLOF. [] Progressing  [x] Met  [] Not Met  9/26/2024   Patient will return to normal ADL's, IADL's, community involvement, recreational activities, and work-related activities with less than or equal to 2/10 pain and maximal function.  [x] Progressing  [] Met  [] Not Met         PLAN     Plan of care Certification: 8/26/2024 to 11/19/2024.    Continue Plan of Care (POC) and progress per patient tolerance. See Treatment section for exercise progression.    Columba Daniel, PT    Disclaimer: This note was prepared using a voice recognition system and is likely to have sound alike errors within the text.

## 2024-11-08 ENCOUNTER — CLINICAL SUPPORT (OUTPATIENT)
Dept: REHABILITATION | Facility: HOSPITAL | Age: 54
End: 2024-11-08
Payer: COMMERCIAL

## 2024-11-08 DIAGNOSIS — M25.561 CHRONIC PAIN OF RIGHT KNEE: Primary | ICD-10-CM

## 2024-11-08 DIAGNOSIS — M25.661 DECREASED RANGE OF MOTION OF RIGHT KNEE: ICD-10-CM

## 2024-11-08 DIAGNOSIS — G89.29 CHRONIC PAIN OF RIGHT KNEE: Primary | ICD-10-CM

## 2024-11-08 DIAGNOSIS — R29.898 WEAKNESS OF RIGHT LOWER EXTREMITY: ICD-10-CM

## 2024-11-08 PROCEDURE — 97530 THERAPEUTIC ACTIVITIES: CPT

## 2024-11-08 PROCEDURE — 97112 NEUROMUSCULAR REEDUCATION: CPT

## 2024-11-08 PROCEDURE — 97110 THERAPEUTIC EXERCISES: CPT

## 2024-11-08 NOTE — PLAN OF CARE
[facility]  Physical Therapy Discharge Note    Name: Zainab Mcgraw  Clinic Number: [id number]    Therapy Diagnosis:   Encounter Diagnoses   Name Primary?    Chronic pain of right knee Yes    Weakness of right lower extremity     Decreased range of motion of right knee      Physician: Tereza Hernandez, *    Physician Orders: PT Eval and Treat  Medical Diagnosis from Referral: Right knee pain, unspecified chronicity [M25.561]   Evaluation Date: 8/26/2024      Date of Last visit: 11/8/2024   Total Visits Received: 18    ASSESSMENT      See daily note    Discharge reason: Patient has reached the maximum rehab potential for the present time    Discharge FOTO Score: see daily note    Goals: see daily note    PLAN   This patient is discharged from Physical Therapy      Ruben Saleh PT

## 2024-11-08 NOTE — PROGRESS NOTES
"[facility]   Physical Therapy Treatment Note    Name: Zainab Mcgraw  Clinic Number: [id number]    Therapy Diagnosis:   Encounter Diagnoses   Name Primary?    Chronic pain of right knee Yes    Weakness of right lower extremity     Decreased range of motion of right knee      Physician: Tereza Hernandez, *    Visit Date: 11/8/2024    Physician Orders: PT Eval and Treat  Medical Diagnosis from Referral: Right knee pain, unspecified chronicity [M25.561]   Evaluation Date: 8/26/2024  Authorization Period Expiration: 8/19/2025  Plan of Care Expiration: 11/19/2024  Progress Note Due:11/19/2024  Visit # / Visits authorized: 16 / 30   FOTO: 2 /3 (last performed on 9/26/2024)     Precautions: Standard  PTA Visit #: 0/5     Time In: 1000  Time Out: 1115  Total Billable Time: 60 minutes    SUBJECTIVE     Pt reports: Feel good today, She had her injection yesterday and is sore from yesterdays session.   Compliance with Hep: Daily  Response to previous treatment: no adverse reactions to treatment/updated HEP  Functional change: Better    Pain: 0/10   Worst: 8/10  Location: knee    OBJECTIVE     Objective Measures updated at progress report unless specified otherwise.     Treatment     Gym/Equipment Access: full  Time to Complete Exercises: increased for cueing and education    Lisa received the treatments listed below:      CPT Intervention Performed   Today Duration / Intensity   MT Patella MOBS -  5 minutes - long sit     Lumbar and Thoracic HVLAT - 5 minutes    TE Chondral Mobility:  x Elliptical 6 minutes      Child pose- quad stretch x 30" holds, x5      Dynamic Warm x Hugs, sweeps, grabs, scorpions     Seated Knee Ext matrix   DL  4x8  #55     Seated Knee flexion Matrix-    DL  4x8  #55     RDL with KB    #25 x15     Hip Thrust    #30 sand bag x15     Leg Press x SL #95 4x8 ea     Knee Extension x SL #35 4x8 ea     Hamstring Curl x DL #35 4x8    NMR Lateral Walks (3 rounds)   1 lap 3 " "rounds - pink band (knees)     Diagonal Walks (2 rounds) SS1 1 lap 3 rounds - pink band (knees)     Side Planks (3 rounds)   30s each side, with clam shell     Hamstring Sliders (2 rounds) SS1 X12 ea side   TA Nordic Hamstring + band (3 rounds)   10x double leg - Green band assist     Step Downs  (2 rounds) SS1 3x12 each leg 6 inch box #15 kettlebell      Sled Push (2 rounds) SS2 #105 - push & pull 1 lap x3 rounds     Wall Sits    (2 rounds) SS2 60" x3 rounds     Inverted Rows (2 rounds) SS2 X8 reps, squat rack bar     PLYOMETRICS:  Hopping DL and SL  Fwd and back hopping  Lateral hopping  Shuttle Jumps DL  Shuttle Jumps SL  Box jumps      30s x 2   30s x 2  30s x 2  5 bands 20x 2 rounds  3 bands 20x each leg 2 rounds   6 inch box 20x focus on force absorption and equal landing     AGILITY:  Lateral Shuffles  Back Pedal  Forward Jogs  Jog with back pedal and shuffle      Full Turf 2 rounds   Full Turf 2 rounds   Full Turf 2 rounds   Full Turf 2 rounds      Wall Ball Squats with pause - 3-5s hold 30x Green ball 20# Vest   PLAN             CPT Codes available for Billing:   (-) minutes of Manual therapy (MT) to improve pain and ROM.  (30) minutes of Therapeutic Exercise (TE) to develop strength, endurance, range of motion, and flexibility.  (15) minutes of Neuromuscular Re-Education (NMR)  to improve: Balance, Coordination, Kinesthetic, Sense, Proprioception, and Posture.  (15) minutes of Therapeutic Activities (TA) to improve functional performance.  (-) minutes of Gait Training (GT) to improve functional gait mechanics.  (-) minutes of Self- Care and Education  Unattended Electrical Stimulation (ES) for muscle performance or pain modulation.  Vasopneumatic Device Therapy () for management of swelling/edema. (75966)  BFR: Blood flow restriction applied during exercise  NP or (-): Not Performed    See EMR under MEDIA for written consent provided for Dry Needling- DATE   Palpation Assessment to determine the " necessity for Functional Dry Needling     PATIENT EDUCATION AND HOME EXERCISES      Education/Self-Care provided:  (included in treatment unless specified above) minutes   Patient educated on the impairments noted above and the effects of physical therapy intervention to improve overall condition and Quality of Life  Patient was educated on all the above exercise prior/during/after for proper posture, positioning, and execution for safe performance with home exercise program.      Written Home Exercises Provided: yes. Prefers: [x] Printed [x] Electronic  Exercises were reviewed and Rylee was able to demonstrate them prior to the end of the session.  Rylee demonstrated good understanding of the education provided. See EMR under Patient Instructions for exercises provided during therapy sessions.    ASSESSMENT     Lisa Mcgraw tolerated Physical Therapy  session well with no  complaints of pain or discomfort.  Objective findings are improving with pain, range of motion, strength, endurance, exercise tolerance, and functional mobility.  Lisa Mcgraw has minimal difficulty with heavy loads through the PFP joint and anterior chain.  Modifications have been made over the last few sessions - increasing her exercise tolerance and decreasing her symptoms.  Zainab demonstrated good understanding of new exercises and will continue to progress at home until next follow-up.     Lisa Is progressing well towards her goals.   Pt prognosis is Good.     Pt will continue to benefit from skilled outpatient physical therapy to address the deficits listed in the problem list box on initial evaluation, provide pt/family education and to maximize pt's level of independence in the home and community environment.     Pt's spiritual, cultural and educational needs considered and pt agreeable to plan of care and goals.    Anticipated Barriers for therapy: adherence to treatment plan and coping style       Short Term Goals:  6 weeks Status   Date Met   PAIN: Pt will report worst pain of 6/10 in order to progress toward max functional ability and improve quality of life. [] Progressing  [x] Met  [] Not Met 9/26/2024    FUNCTION: Patient will demonstrate improved function as indicated by a score of greater than or equal to 80 out of 100 on FOTO. [] Progressing  [x] Met  [] Not Met 9/26/2024    MOBILITY: Patient will improve AROM to 50% of stated goals, listed in objective measures above, in order to progress towards independence with functional activities.  [] Progressing  [x] Met  [] Not Met 9/26/2024    STRENGTH: Patient will improve strength to 50% of stated goals, listed in objective measures above, in order to progress towards independence with functional activities. [] Progressing  [x] Met  [] Not Met 9/26/2024    POSTURE: Patient will correct postural deviations in sitting and standing, to decrease pain and promote long term stability.  [] Progressing  [x] Met  [] Not Met 9/26/2024    GAIT: Patient will demonstrate improved gait mechanics including improved step length and stance time in order to improve functional mobility, improve quality of life, and decrease risk of further injury or fall.  [] Progressing  [x] Met  [] Not Met 9/26/2024    HEP: Patient will demonstrate independence with HEP in order to progress toward functional independence. [] Progressing  [x] Met  [] Not Met 9/26/2024       Long Term Goals:  12 weeks Status Date Met   PAIN: Pt will report worst pain of 2/10 in order to progress toward max functional ability and improve quality of life [x] Progressing  [] Met  [] Not Met     FUNCTION: Patient will demonstrate improved function as indicated by a score of greater than or equal to 83 out of 100 on FOTO. [] Progressing  [x] Met  [] Not Met  9/26/2024   MOBILITY: Patient will improve AROM to stated goals, listed in objective measures above, in order to return to maximal functional potential and improve quality of life.  [x]  Progressing  [] Met  [] Not Met     STRENGTH: Patient will improve strength to stated goals, listed in objective measures above, in order to improve functional independence and quality of life.  [x] Progressing  [] Met  [] Not Met     GAIT: Patient will demonstrate normalized gait mechanics with minimal compensation in order to return to PLOF. [] Progressing  [x] Met  [] Not Met  9/26/2024   Patient will return to normal ADL's, IADL's, community involvement, recreational activities, and work-related activities with less than or equal to 2/10 pain and maximal function.  [x] Progressing  [] Met  [] Not Met         PLAN     Plan of care Certification: 8/26/2024 to 11/19/2024.    Continue Plan of Care (POC) and progress per patient tolerance. See Treatment section for exercise progression.    Ruben Saleh, PT    Disclaimer: This note was prepared using a voice recognition system and is likely to have sound alike errors within the text.

## 2024-11-14 ENCOUNTER — PROCEDURE VISIT (OUTPATIENT)
Dept: SPORTS MEDICINE | Facility: CLINIC | Age: 54
End: 2024-11-14
Payer: COMMERCIAL

## 2024-11-14 VITALS — BODY MASS INDEX: 25.83 KG/M2 | HEIGHT: 65 IN | WEIGHT: 155 LBS

## 2024-11-14 DIAGNOSIS — M17.11 OSTEOARTHRITIS OF RIGHT KNEE, UNSPECIFIED OSTEOARTHRITIS TYPE: Primary | ICD-10-CM

## 2024-11-14 NOTE — PROCEDURES
Large Joint Aspiration/Injection: R knee    Date/Time: 11/14/2024 11:00 AM    Performed by: Tereza Hernandez PA-C  Authorized by: Tereza Hernandez PA-C    Consent Done?:  Yes (Verbal)  Indications:  Joint swelling and pain  Site marked: the procedure site was marked    Timeout: prior to procedure the correct patient, procedure, and site was verified    Prep: patient was prepped and draped in usual sterile fashion      Local anesthesia used?: Yes    Local anesthetic:  Topical anesthetic    Details:  Needle Size:  22 G  Ultrasonic Guidance for needle placement?: No    Approach:  Superior  Location:  Knee  Site:  R knee  Medications:  20 mg sodium hyaluronate (EUFLEXXA) 10 mg/mL(mw 2.4 -3.6 million)  Patient tolerance:  Patient tolerated the procedure well with no immediate complications     2/3

## 2024-11-22 ENCOUNTER — PROCEDURE VISIT (OUTPATIENT)
Dept: SPORTS MEDICINE | Facility: CLINIC | Age: 54
End: 2024-11-22
Payer: COMMERCIAL

## 2024-11-22 VITALS — HEIGHT: 65 IN | BODY MASS INDEX: 25.83 KG/M2 | WEIGHT: 155 LBS

## 2024-11-22 DIAGNOSIS — M17.11 OSTEOARTHRITIS OF RIGHT KNEE, UNSPECIFIED OSTEOARTHRITIS TYPE: Primary | ICD-10-CM

## 2024-11-22 NOTE — PROCEDURES
Large Joint Aspiration/Injection: R knee    Date/Time: 11/22/2024 12:00 PM    Performed by: Terzea Hernandez PA-C  Authorized by: Tereza Hernandez PA-C    Consent Done?:  Yes (Verbal)  Indications:  Joint swelling and pain  Site marked: the procedure site was marked    Timeout: prior to procedure the correct patient, procedure, and site was verified    Prep: patient was prepped and draped in usual sterile fashion      Local anesthesia used?: Yes    Local anesthetic:  Topical anesthetic    Details:  Needle Size:  22 G  Ultrasonic Guidance for needle placement?: No    Approach:  Superior  Location:  Knee  Site:  R knee  Medications:  20 mg sodium hyaluronate (EUFLEXXA) 10 mg/mL(mw 2.4 -3.6 million)  Patient tolerance:  Patient tolerated the procedure well with no immediate complications

## 2024-12-04 ENCOUNTER — OFFICE VISIT (OUTPATIENT)
Dept: SPORTS MEDICINE | Facility: CLINIC | Age: 54
End: 2024-12-04
Payer: COMMERCIAL

## 2024-12-04 DIAGNOSIS — M25.561 PAIN AND SWELLING OF RIGHT KNEE: ICD-10-CM

## 2024-12-04 DIAGNOSIS — M17.11 OSTEOARTHRITIS OF RIGHT KNEE, UNSPECIFIED OSTEOARTHRITIS TYPE: Primary | ICD-10-CM

## 2024-12-04 DIAGNOSIS — M25.461 PAIN AND SWELLING OF RIGHT KNEE: ICD-10-CM

## 2024-12-04 DIAGNOSIS — M25.561 RIGHT KNEE PAIN, UNSPECIFIED CHRONICITY: ICD-10-CM

## 2024-12-04 PROCEDURE — 99499 UNLISTED E&M SERVICE: CPT | Mod: 95,,, | Performed by: PHYSICIAN ASSISTANT

## 2024-12-10 NOTE — PATIENT INSTRUCTIONS
Assessment:  Zainab Mcgraw is a  54 y.o. female [facility] ([facility] Data Unavailable with a chief complaint of Pain of the Right Knee  Virtual visit today for a recheck on right knee pain completed visco on 11/22/24.   Right knee pain     Encounter Diagnoses   Name Primary?    Osteoarthritis of right knee, unspecified osteoarthritis type Yes    Right knee pain, unspecified chronicity     Pain and swelling of right knee       Plan:  Continue home exercise program   Continue conservative treatment   Follow up in 5 months    Follow-up: 5 months or sooner if there are any problems between now and then.    Leave Review:   Google: Leave Google Review  Healthgrades: Leave Healthgrades Review    After Hours Number: [phone]

## 2024-12-10 NOTE — PROGRESS NOTES
Patient ID: Zainab Mcgraw  YOB: 1970  MRN: [id number]    Chief Complaint: Pain of the Right Knee    History of Present Illness: Zainab Mcgraw is a  54 y.o. female [facility] ([facility]) Data Unavailable with a chief complaint of Pain of the Right Knee    The patient location is: [facility]    Each patient to whom he or she provides medical services by telemedicine is:  (1) informed of the relationship between the physician and patient and the respective role of any other health care provider with respect to management of the patient; and (2) notified that he or she may decline to receive medical services by telemedicine and may withdraw from such care at any time.The patient location is: home     VISIT TYPE X   Virtual visit with synchronous audio and video     Telephone E/M service  x     HPI  Zainab Mcgraw presents today for a virtual audio appointment for a recheck on right knee pain. She has completed visco injections on 11/22/24 with euflexxa. She states that she is doing well no issues. States that she had the most pain with injection #2.   Denies any fevers, chills, night sweats, numbness and tingling.     Past Medical History:   No past medical history on file.  No past surgical history on file.  No family history on file.  Social History     Socioeconomic History    Marital status:    Tobacco Use    Smoking status: Never     Passive exposure: Past    Smokeless tobacco: Never   Social History Narrative    ** Merged History Encounter **          Social Drivers of Health     Financial Resource Strain: Low Risk  (8/15/2024)    Received from [facility]    Overall Financial Resource Strain (CARDIA)     Difficulty of Paying Living Expenses: Not hard at all   Food Insecurity: No Food Insecurity (8/15/2024)    Received from [facility]    Hunger Vital Sign     Worried About Running Out of Food in the Last Year: Never true     Ran Out of  Food in the Last Year: Never true   Transportation Needs: No Transportation Needs (8/15/2024)    Received from [facility]    PRAPARE - Transportation     Lack of Transportation (Medical): No     Lack of Transportation (Non-Medical): No   Housing Stability: Unknown (8/15/2024)    Received from [facility]    Housing Stability Vital Sign     Unable to Pay for Housing in the Last Year: No     Homeless in the Last Year: No     Medication List with Changes/Refills   Current Medications    CHOLECALCIFEROL, VITD3,/VIT K2 (VITAMIN D3-VITAMIN K2 ORAL)    Take by mouth.    DICLOFENAC SODIUM (VOLTAREN) 1 % GEL    Apply 2 g topically 3 (three) times daily.    LACTOBACILLUS RHAMNOSUS GG (CULTURELLE) 10 BILLION CELL CAPSULE    Take 1 capsule by mouth.    NAPROXEN (NAPROSYN) 500 MG TABLET    TAKE 1 TABLET BY MOUTH TWICE A DAY    TURMERIC ORAL    Take 1 capsule by mouth every evening.     Review of patient's allergies indicates:  No Known Allergies  ROS    Physical Exam:   There is no height or weight on file to calculate BMI.  There were no vitals filed for this visit.   GENERAL: Well appearing, appropriate for stated age, no acute distress.  NEURO: Awake, alert, and oriented x 3.  PSYCH: Mood & affect are appropriate.  Ortho/SPM Exam  Limited physical exam due to virtual visit  Imaging:    X-ray Knee Ortho Right with Flexion  Narrative: EXAM: XR KNEE ORTHO RIGHT WITH FLEXION    CLINICAL HISTORY: Knee pain    FINDINGS:  No fracture or dislocation is identified.  A small joint effusion is visible on the lateral view.  Joint spaces are maintained at both knees.  Impression: 1.  No evidence of acute osseous injury.  2.  Small right knee joint effusion.    Finalized on: 8/19/2024 4:34 PM By:  Gabe Rodriguez  BRELIELG# [id number]      2024-08-19 16:36:[id number]    MARTA      Relevant imaging results reviewed and interpreted by me, discussed with the patient and / or family today.     Other Tests:         Patient Instructions    Assessment:  Zainab Mcgraw is a  54 y.o. female [facility] ([facility]) Data Unavailable with a chief complaint of Pain of the Right Knee  Virtual visit today for a recheck on right knee pain completed visco on 11/22/24.   Right knee pain     Encounter Diagnoses   Name Primary?    Osteoarthritis of right knee, unspecified osteoarthritis type Yes    Right knee pain, unspecified chronicity     Pain and swelling of right knee       Plan:  Continue home exercise program   Continue conservative treatment   Follow up in 5 months    Follow-up: 5 months or sooner if there are any problems between now and then.    Leave Review:   Google: Leave Google Review  Healthgrades: Leave Healthgrades Review    After Hours Number: [phone]      Provider Note/Medical Decision Making:      EST MINUTES X   40700 5     05382 10     27878 15     33164 25     [id number] 40     NEW       66041 10     04941 20     04557 30     14786 45     97680 60     PHONE        5-10  x   99442 11-20     71274 21-30         I discussed worrisome and red flag signs and symptoms with the patient. The patient expressed understanding and agreed to alert me immediately or to go to the emergency room if they experience any of these.   Treatment plan was developed with input from the patient/family, and they expressed understanding and agreement with the plan. All questions were answered today.      Tereza Rodríguez PA-C  Sports Medicine Physician Assistant     Disclaimer: This note was prepared using a voice recognition system and is likely to have sound alike errors within the text.